# Patient Record
Sex: FEMALE | Race: WHITE | NOT HISPANIC OR LATINO | Employment: PART TIME | URBAN - METROPOLITAN AREA
[De-identification: names, ages, dates, MRNs, and addresses within clinical notes are randomized per-mention and may not be internally consistent; named-entity substitution may affect disease eponyms.]

---

## 2023-02-26 PROBLEM — H91.90 HEARING LOSS: Status: ACTIVE | Noted: 2023-02-26

## 2023-02-26 PROBLEM — G56.00 CARPAL TUNNEL SYNDROME: Status: ACTIVE | Noted: 2023-02-26

## 2023-02-26 PROBLEM — H90.3 BILATERAL HIGH FREQUENCY SENSORINEURAL HEARING LOSS: Status: ACTIVE | Noted: 2023-02-26

## 2023-02-26 PROBLEM — K21.9 GERD (GASTROESOPHAGEAL REFLUX DISEASE): Status: ACTIVE | Noted: 2023-02-26

## 2023-02-26 PROBLEM — G47.33 MODERATE OBSTRUCTIVE SLEEP APNEA: Status: ACTIVE | Noted: 2023-02-26

## 2023-02-26 PROBLEM — R53.83 FATIGUE: Status: ACTIVE | Noted: 2023-02-26

## 2023-02-26 PROBLEM — H93.13 TINNITUS OF BOTH EARS: Status: ACTIVE | Noted: 2023-02-26

## 2023-02-26 PROBLEM — E11.9 DM2 (DIABETES MELLITUS, TYPE 2) (MULTI): Status: ACTIVE | Noted: 2023-02-26

## 2023-02-26 PROBLEM — E66.01 CLASS 2 SEVERE OBESITY WITH SERIOUS COMORBIDITY AND BODY MASS INDEX (BMI) OF 38.0 TO 38.9 IN ADULT (MULTI): Status: ACTIVE | Noted: 2023-02-26

## 2023-02-26 PROBLEM — E55.9 VITAMIN D DEFICIENCY: Status: ACTIVE | Noted: 2023-02-26

## 2023-02-26 PROBLEM — R06.83 SNORING: Status: ACTIVE | Noted: 2023-02-26

## 2023-02-26 PROBLEM — H69.91 DYSFUNCTION OF RIGHT EUSTACHIAN TUBE: Status: ACTIVE | Noted: 2023-02-26

## 2023-02-26 PROBLEM — R29.898 BILATERAL ARM WEAKNESS: Status: ACTIVE | Noted: 2023-02-26

## 2023-02-26 PROBLEM — R25.1 SHAKINESS: Status: ACTIVE | Noted: 2023-02-26

## 2023-02-26 PROBLEM — R14.0 ABDOMINAL BLOATING: Status: ACTIVE | Noted: 2023-02-26

## 2023-02-26 PROBLEM — R63.5 WEIGHT GAIN: Status: ACTIVE | Noted: 2023-02-26

## 2023-02-26 PROBLEM — F41.9 ANXIETY DISORDER: Status: ACTIVE | Noted: 2023-02-26

## 2023-02-26 PROBLEM — R11.10 VOMITING: Status: ACTIVE | Noted: 2023-02-26

## 2023-02-26 PROBLEM — E66.812 CLASS 2 SEVERE OBESITY WITH SERIOUS COMORBIDITY AND BODY MASS INDEX (BMI) OF 38.0 TO 38.9 IN ADULT: Status: ACTIVE | Noted: 2023-02-26

## 2023-02-26 PROBLEM — R92.8 ABNORMAL MAMMOGRAM: Status: ACTIVE | Noted: 2023-02-26

## 2023-02-26 PROBLEM — K22.2 BENIGN ESOPHAGEAL STRICTURE: Status: ACTIVE | Noted: 2023-02-26

## 2023-02-26 PROBLEM — E28.319 EARLY MENOPAUSE OCCURRING IN PATIENT AGE YOUNGER THAN 45 YEARS: Status: ACTIVE | Noted: 2023-02-26

## 2023-02-26 RX ORDER — METFORMIN HYDROCHLORIDE 1000 MG/1
1 TABLET ORAL 2 TIMES DAILY
COMMUNITY
End: 2023-03-22 | Stop reason: SDUPTHER

## 2023-02-26 RX ORDER — CLONAZEPAM 0.25 MG/1
0.25 TABLET, ORALLY DISINTEGRATING ORAL AS NEEDED
COMMUNITY

## 2023-02-26 RX ORDER — LANCETS 33 GAUGE
EACH MISCELLANEOUS
COMMUNITY
End: 2023-10-16 | Stop reason: ALTCHOICE

## 2023-02-26 RX ORDER — PANTOPRAZOLE SODIUM 40 MG/1
1 TABLET, DELAYED RELEASE ORAL DAILY
COMMUNITY
End: 2023-04-17

## 2023-02-26 RX ORDER — BLOOD-GLUCOSE METER
1 EACH MISCELLANEOUS 2 TIMES DAILY
COMMUNITY
End: 2023-10-16 | Stop reason: ALTCHOICE

## 2023-02-26 RX ORDER — SERTRALINE HYDROCHLORIDE 100 MG/1
2 TABLET, FILM COATED ORAL DAILY
COMMUNITY
End: 2023-03-17

## 2023-03-17 DIAGNOSIS — F41.1 GENERALIZED ANXIETY DISORDER: Primary | ICD-10-CM

## 2023-03-17 RX ORDER — SERTRALINE HYDROCHLORIDE 100 MG/1
TABLET, FILM COATED ORAL
Qty: 180 TABLET | Refills: 0 | Status: SHIPPED | OUTPATIENT
Start: 2023-03-17 | End: 2023-06-15

## 2023-03-22 ENCOUNTER — OFFICE VISIT (OUTPATIENT)
Dept: PRIMARY CARE | Facility: CLINIC | Age: 48
End: 2023-03-22
Payer: COMMERCIAL

## 2023-03-22 ENCOUNTER — APPOINTMENT (OUTPATIENT)
Dept: PRIMARY CARE | Facility: CLINIC | Age: 48
End: 2023-03-22
Payer: COMMERCIAL

## 2023-03-22 VITALS
WEIGHT: 232 LBS | SYSTOLIC BLOOD PRESSURE: 128 MMHG | TEMPERATURE: 97.1 F | RESPIRATION RATE: 18 BRPM | BODY MASS INDEX: 36.41 KG/M2 | DIASTOLIC BLOOD PRESSURE: 78 MMHG | HEART RATE: 86 BPM | HEIGHT: 67 IN

## 2023-03-22 DIAGNOSIS — F41.1 GENERALIZED ANXIETY DISORDER: ICD-10-CM

## 2023-03-22 DIAGNOSIS — K21.9 GASTROESOPHAGEAL REFLUX DISEASE WITHOUT ESOPHAGITIS: Primary | ICD-10-CM

## 2023-03-22 DIAGNOSIS — E11.9 TYPE 2 DIABETES MELLITUS WITHOUT COMPLICATION, WITHOUT LONG-TERM CURRENT USE OF INSULIN (MULTI): ICD-10-CM

## 2023-03-22 LAB — POC HEMOGLOBIN A1C: 7.1 % (ref 4.2–6.5)

## 2023-03-22 PROCEDURE — 99214 OFFICE O/P EST MOD 30 MIN: CPT | Performed by: FAMILY MEDICINE

## 2023-03-22 PROCEDURE — 3074F SYST BP LT 130 MM HG: CPT | Performed by: FAMILY MEDICINE

## 2023-03-22 PROCEDURE — 1036F TOBACCO NON-USER: CPT | Performed by: FAMILY MEDICINE

## 2023-03-22 PROCEDURE — 3078F DIAST BP <80 MM HG: CPT | Performed by: FAMILY MEDICINE

## 2023-03-22 PROCEDURE — 83036 HEMOGLOBIN GLYCOSYLATED A1C: CPT | Performed by: FAMILY MEDICINE

## 2023-03-22 RX ORDER — ESTRADIOL 0.1 MG/G
CREAM VAGINAL
COMMUNITY
Start: 2023-01-30 | End: 2023-03-22 | Stop reason: ALTCHOICE

## 2023-03-22 RX ORDER — METFORMIN HYDROCHLORIDE 1000 MG/1
1000 TABLET ORAL 2 TIMES DAILY
Qty: 180 TABLET | Refills: 1 | Status: SHIPPED | OUTPATIENT
Start: 2023-03-22 | End: 2023-09-29

## 2023-03-22 RX ORDER — HYDROXYZINE HYDROCHLORIDE 25 MG/1
1 TABLET, FILM COATED ORAL 2 TIMES DAILY
COMMUNITY
Start: 2022-11-15 | End: 2023-10-16 | Stop reason: ALTCHOICE

## 2023-03-22 NOTE — PROGRESS NOTES
Molly Martins is a 48 y.o. female here today for 6 month follow up GERD and DM.        Diabetes recheck -- Patient feeling well.  No CP, numbness of feet, blurred vision, polyuria.  Trying to follow diet.  No side effects from medications.  No hypoglycemic symptoms.   We added Jardiance at her last visit because her A1c was not well controlled.  Has lost some weight since starting Jardiance.  A1C improved to 7.1 today.      GERD recheck -- Patient reports GI symptoms are well controlled with current treatment.  No heartburn, chest pain, vomiting, diarrhea.  No SE's from current treatment.  Patient wishes to continue the same treatment.      Mood disorder recheck -- Patient feels like condition is well controlled.  Has good control of mood and emotional reactions.  No SE's or problems with medications.  No homicidal or suicidal ideation.  Patient wishes to continue same medications.      Objective    Visit Vitals  /78   Pulse 86   Temp 36.2 °C (97.1 °F)   Resp 18       Physical Exam   General - Not in acute distress and cooperative.  Build & Nutrition - Well developed  Posture - Normal  Gait - Normal  Mental Status - alert and oriented x 3    Head - Normocephalic    Neck - Thyroid normal size    Eyes - Bilateral - Sclera clear and lids pink without edema or mass.      Skin - Warm and dry with no rashes on visible skin    Lungs - Clear to auscultation and normal breathing effort    Cardiovascular - RRR and no murmurs, rubs or thrill.    Peripheral Vascular - Bilateral - no edema present    Neuropsychiatric - normal mood and affect        Assessment      Assess/Plan SmartLinks: Diagnoses and all orders for this visit:  Gastroesophageal reflux disease without esophagitis   Condition well controlled.  No change in current treatment regimen.  Refill given of current medication.  Make a follow up appointment with me for recheck in 6 months.    Type 2 diabetes mellitus without complication, without long-term  current use of insulin (CMS/[facility])  -     metFORMIN (Glucophage) 1,000 mg tablet; Take 1 tablet (1,000 mg) by mouth in the morning and 1 tablet (1,000 mg) before bedtime.  -     POCT glycosylated hemoglobin (Hb A1C) manually resulted  Her A1c has improved to 7.1 since starting Jardiance 3 months ago.  No change in current treatment regimen.  Refill given of current medication.  Make a follow up appointment with me for recheck in 4 months.  Generalized anxiety disorder   Condition well controlled.  No change in current treatment regimen.  Refill given of current medication.  Make a follow up appointment with me for recheck in 6 months.

## 2023-04-16 DIAGNOSIS — K21.9 GASTRO-ESOPHAGEAL REFLUX DISEASE WITHOUT ESOPHAGITIS: ICD-10-CM

## 2023-04-17 RX ORDER — PANTOPRAZOLE SODIUM 40 MG/1
TABLET, DELAYED RELEASE ORAL
Qty: 90 TABLET | Refills: 1 | Status: SHIPPED | OUTPATIENT
Start: 2023-04-17 | End: 2023-10-17 | Stop reason: SDUPTHER

## 2023-05-26 DIAGNOSIS — E11.9 TYPE 2 DIABETES MELLITUS WITHOUT COMPLICATION, WITHOUT LONG-TERM CURRENT USE OF INSULIN (MULTI): Primary | ICD-10-CM

## 2023-05-26 RX ORDER — EMPAGLIFLOZIN 10 MG/1
TABLET, FILM COATED ORAL
Qty: 90 TABLET | Refills: 1 | Status: SHIPPED | OUTPATIENT
Start: 2023-05-26 | End: 2023-07-17 | Stop reason: SDUPTHER

## 2023-06-15 DIAGNOSIS — F41.1 GENERALIZED ANXIETY DISORDER: ICD-10-CM

## 2023-06-15 RX ORDER — SERTRALINE HYDROCHLORIDE 100 MG/1
TABLET, FILM COATED ORAL
Qty: 180 TABLET | Refills: 0 | Status: SHIPPED | OUTPATIENT
Start: 2023-06-15

## 2023-07-17 ENCOUNTER — OFFICE VISIT (OUTPATIENT)
Dept: PRIMARY CARE | Facility: CLINIC | Age: 48
End: 2023-07-17
Payer: COMMERCIAL

## 2023-07-17 VITALS
HEART RATE: 91 BPM | TEMPERATURE: 97.2 F | SYSTOLIC BLOOD PRESSURE: 120 MMHG | BODY MASS INDEX: 35.16 KG/M2 | HEIGHT: 67 IN | DIASTOLIC BLOOD PRESSURE: 78 MMHG | RESPIRATION RATE: 16 BRPM | WEIGHT: 224 LBS

## 2023-07-17 DIAGNOSIS — Z12.31 ENCOUNTER FOR SCREENING MAMMOGRAM FOR MALIGNANT NEOPLASM OF BREAST: ICD-10-CM

## 2023-07-17 DIAGNOSIS — E11.9 TYPE 2 DIABETES MELLITUS WITHOUT COMPLICATION, WITHOUT LONG-TERM CURRENT USE OF INSULIN (MULTI): ICD-10-CM

## 2023-07-17 PROBLEM — M54.2 CERVICALGIA: Status: ACTIVE | Noted: 2023-07-17

## 2023-07-17 PROBLEM — K80.20 SYMPTOMATIC CHOLELITHIASIS: Status: ACTIVE | Noted: 2020-05-07

## 2023-07-17 PROBLEM — M25.512 LEFT SHOULDER PAIN: Status: ACTIVE | Noted: 2023-07-17

## 2023-07-17 LAB — POC HEMOGLOBIN A1C: 7.2 % (ref 4.2–6.5)

## 2023-07-17 PROCEDURE — 1036F TOBACCO NON-USER: CPT | Performed by: FAMILY MEDICINE

## 2023-07-17 PROCEDURE — 99213 OFFICE O/P EST LOW 20 MIN: CPT | Performed by: FAMILY MEDICINE

## 2023-07-17 PROCEDURE — 3074F SYST BP LT 130 MM HG: CPT | Performed by: FAMILY MEDICINE

## 2023-07-17 PROCEDURE — 83036 HEMOGLOBIN GLYCOSYLATED A1C: CPT | Performed by: FAMILY MEDICINE

## 2023-07-17 PROCEDURE — 3078F DIAST BP <80 MM HG: CPT | Performed by: FAMILY MEDICINE

## 2023-07-17 NOTE — PROGRESS NOTES
7.2Kirsten SHAVONNE Martins is a 48 y.o. female here today for 4 month follow up DM.   Chief Complaint   Patient presents with    Diabetes        HPI   Left ear pain off and on for 2 months.      Diabetes recheck -- Patient feeling well.  No CP, numbness of feet, blurred vision, polyuria.  Trying to follow diet.  No side effects from medications.  No hypoglycemic symptoms.   Not monitoring.  A1C was 7.2 today and was 7.1 at last visit.      Trouble sleeping again - using melatonin OTC prn but got used to it and not using now.  Psychiatrist has Rxed some meds for this.  Trazodone made her groggy.  Doxepin did not help - started recently.            Current Outpatient Medications:     blood sugar diagnostic (OneTouch Verio test strips) strip, 1 each  in the morning and 1 each in the evening., Disp: , Rfl:     clonazePAM (KlonoPIN) 0.25 mg disintegrating tablet, Take 1 tablet (0.25 mg) by mouth if needed., Disp: , Rfl:     hydrOXYzine HCL (Atarax) 25 mg tablet, Take 1 tablet (25 mg) by mouth 2 times a day., Disp: , Rfl:     lancets 33 gauge misc, Use as directed., Disp: , Rfl:     metFORMIN (Glucophage) 1,000 mg tablet, Take 1 tablet (1,000 mg) by mouth in the morning and 1 tablet (1,000 mg) before bedtime., Disp: 180 tablet, Rfl: 1    pantoprazole (ProtoNix) 40 mg EC tablet, TAKE 1 TABLET BY MOUTH EVERY DAY, Disp: 90 tablet, Rfl: 1    sertraline (Zoloft) 100 mg tablet, TAKE 2 TABLETS DAILY, Disp: 180 tablet, Rfl: 0    empagliflozin (Jardiance) 25 mg, Take 1 tablet (25 mg) by mouth once daily., Disp: 90 tablet, Rfl: 1    Patient Active Problem List   Diagnosis    Abdominal bloating    Abnormal mammogram    Benign esophageal stricture    Anxiety disorder    Bilateral arm weakness    Bilateral high frequency sensorineural hearing loss    Carpal tunnel syndrome    Type 2 diabetes mellitus without complication, without long-term current use of insulin (CMS/HCC)    Dysfunction of right eustachian tube    Early menopause occurring  "in patient age younger than 45 years    Fatigue    GERD (gastroesophageal reflux disease)    Hearing loss    Moderate obstructive sleep apnea    Shakiness    Snoring    Tinnitus of both ears    Vitamin D deficiency    Vomiting    Weight gain    Class 2 severe obesity with serious comorbidity and body mass index (BMI) of 38.0 to 38.9 in adult (CMS/[facility])    Cervicalgia    Group B streptococcal infection in pregnancy    History of anxiety disorder    Left shoulder pain    Polyhydramnios    Symptomatic cholelithiasis         Recent Results (from the past 2016 hour(s))   POCT glycosylated hemoglobin (Hb A1C) manually resulted    Collection Time: 07/17/23 10:18 AM   Result Value Ref Range    POC HEMOGLOBIN A1c 7.2 (A) 4.2 - 6.5 %        Objective    Visit Vitals  /78   Pulse 91   Temp 36.2 °C (97.2 °F)   Resp 16   Ht 1.702 m (5' 7\")   Wt 102 kg (224 lb)   BMI 35.08 kg/m²     Body mass index is 35.08 kg/m².     Physical Exam   General - Not in acute distress and cooperative.  Build & Nutrition - Well developed  Posture - Normal  Gait - Normal  Mental Status - alert and oriented x 3    Head - Normocephalic    Neck - Thyroid normal size    Eyes - Bilateral - Sclera clear and lids pink without edema or mass.      Skin - Warm and dry with no rashes on visible skin    Lungs - Clear to auscultation and normal breathing effort    Cardiovascular - RRR and no murmurs, rubs or thrill.    Peripheral Vascular - Bilateral - no edema present    Neuropsychiatric - normal mood and affect    Ears-bilateral tympanic membranes and canals are completely normal on exam.    Assessment    1. Encounter for screening mammogram for malignant neoplasm of breast  BI mammo bilateral screening tomosynthesis   The patient is due for mammogram so this was ordered.     2. Type 2 diabetes mellitus without complication, without long-term current use of insulin (CMS/[facility])  POCT glycosylated hemoglobin (Hb A1C) manually resulted, CBC, Comprehensive " Metabolic Panel, Lipid Panel, Albumin , Urine Random, empagliflozin (Jardiance) 25 mg   Her A1c is suboptimally controlled and has increased slightly since her last visit.  We discussed diet changes.  We will increase Jardiance to 25 mg daily and continue the same dose of metformin.  We will recheck in 6 months.  Appropriate labs ordered or reviewed.     I recommend that she discuss the problems sleeping with her psychiatrist since he has been managing this.

## 2023-09-29 DIAGNOSIS — E11.9 TYPE 2 DIABETES MELLITUS WITHOUT COMPLICATION, WITHOUT LONG-TERM CURRENT USE OF INSULIN (MULTI): ICD-10-CM

## 2023-09-29 RX ORDER — METFORMIN HYDROCHLORIDE 1000 MG/1
1000 TABLET ORAL 2 TIMES DAILY
Qty: 180 TABLET | Refills: 1 | Status: SHIPPED | OUTPATIENT
Start: 2023-09-29 | End: 2023-10-17 | Stop reason: SDUPTHER

## 2023-10-13 ASSESSMENT — ENCOUNTER SYMPTOMS
CONFUSION: 0
POLYPHAGIA: 0
NERVOUS/ANXIOUS: 0
SWEATS: 0
FATIGUE: 0
SPEECH DIFFICULTY: 0
BLURRED VISION: 0
HUNGER: 0
VISUAL CHANGE: 0
WEAKNESS: 0
HEADACHES: 0
BLACKOUTS: 0
DIZZINESS: 0
WEIGHT LOSS: 0
POLYDIPSIA: 0
SEIZURES: 0
TREMORS: 0

## 2023-10-16 RX ORDER — DOXEPIN HYDROCHLORIDE 10 MG/1
CAPSULE ORAL
COMMUNITY
Start: 2023-09-08 | End: 2024-04-17 | Stop reason: WASHOUT

## 2023-10-17 ENCOUNTER — OFFICE VISIT (OUTPATIENT)
Dept: PRIMARY CARE | Facility: CLINIC | Age: 48
End: 2023-10-17
Payer: COMMERCIAL

## 2023-10-17 VITALS
SYSTOLIC BLOOD PRESSURE: 126 MMHG | BODY MASS INDEX: 36.1 KG/M2 | DIASTOLIC BLOOD PRESSURE: 74 MMHG | HEIGHT: 67 IN | HEART RATE: 102 BPM | WEIGHT: 230 LBS | RESPIRATION RATE: 18 BRPM | TEMPERATURE: 97 F

## 2023-10-17 DIAGNOSIS — E11.9 TYPE 2 DIABETES MELLITUS WITHOUT COMPLICATION, WITHOUT LONG-TERM CURRENT USE OF INSULIN (MULTI): ICD-10-CM

## 2023-10-17 DIAGNOSIS — E55.9 VITAMIN D DEFICIENCY: Primary | ICD-10-CM

## 2023-10-17 DIAGNOSIS — K21.9 GASTRO-ESOPHAGEAL REFLUX DISEASE WITHOUT ESOPHAGITIS: ICD-10-CM

## 2023-10-17 PROBLEM — F41.1 GENERALIZED ANXIETY DISORDER: Status: ACTIVE | Noted: 2023-02-26

## 2023-10-17 PROBLEM — R11.10 VOMITING: Status: RESOLVED | Noted: 2023-02-26 | Resolved: 2023-10-17

## 2023-10-17 PROBLEM — R06.83 SNORING: Status: RESOLVED | Noted: 2023-02-26 | Resolved: 2023-10-17

## 2023-10-17 LAB — POC HEMOGLOBIN A1C: 7.1 % (ref 4.2–6.5)

## 2023-10-17 PROCEDURE — 99214 OFFICE O/P EST MOD 30 MIN: CPT | Performed by: FAMILY MEDICINE

## 2023-10-17 PROCEDURE — 83036 HEMOGLOBIN GLYCOSYLATED A1C: CPT | Performed by: FAMILY MEDICINE

## 2023-10-17 PROCEDURE — 3074F SYST BP LT 130 MM HG: CPT | Performed by: FAMILY MEDICINE

## 2023-10-17 PROCEDURE — 1036F TOBACCO NON-USER: CPT | Performed by: FAMILY MEDICINE

## 2023-10-17 PROCEDURE — 3078F DIAST BP <80 MM HG: CPT | Performed by: FAMILY MEDICINE

## 2023-10-17 RX ORDER — PANTOPRAZOLE SODIUM 40 MG/1
40 TABLET, DELAYED RELEASE ORAL DAILY
Qty: 90 TABLET | Refills: 1 | Status: SHIPPED | OUTPATIENT
Start: 2023-10-17 | End: 2024-04-18 | Stop reason: SDUPTHER

## 2023-10-17 RX ORDER — METFORMIN HYDROCHLORIDE 1000 MG/1
1000 TABLET ORAL 2 TIMES DAILY
Qty: 180 TABLET | Refills: 1 | Status: SHIPPED | OUTPATIENT
Start: 2023-10-17 | End: 2024-04-18 | Stop reason: ALTCHOICE

## 2023-10-17 ASSESSMENT — ENCOUNTER SYMPTOMS
BLACKOUTS: 0
SPEECH DIFFICULTY: 0
WEIGHT LOSS: 0
SEIZURES: 0
HUNGER: 0
TREMORS: 0
CONFUSION: 0
POLYPHAGIA: 0
VISUAL CHANGE: 0
WEAKNESS: 0
BLURRED VISION: 0
HEADACHES: 0
SWEATS: 0
DIZZINESS: 0
NERVOUS/ANXIOUS: 0
FATIGUE: 0
POLYDIPSIA: 0

## 2023-10-17 NOTE — PROGRESS NOTES
Answers submitted by the patient for this visit:  Diabetes Questionnaire (Submitted on 10/13/2023)  Chief Complaint: Diabetes problem  Diabetes type: type 2  MedicAlert ID: No  Disease duration: 1 Years  blurred vision: No  chest pain: No  fatigue: No  foot paresthesias: No  foot ulcerations: No  polydipsia: No  polyphagia: No  polyuria: No  visual change: No  weakness: No  weight loss: No  Symptom course: stable  confusion: No  dizziness: No  headaches: No  hunger: No  mood changes: No  nervous/anxious: No  pallor: No  seizures: No  sleepiness: No  speech difficulty: No  sweats: No  tremors: No  blackouts: No  hospitalization: No  nocturnal hypoglycemia: No  required assistance: No  required glucagon: No  CVA: No  heart disease: No  impotence: No  nephropathy: No  peripheral neuropathy: No  PVD: No  retinopathy: No  CAD risks: no known risk factors  Current treatments: oral agent (monotherapy)  Treatment compliance: all of the time  Blood glucose trend: no change  Molly Martins is a 48 y.o. female here today for   Chief Complaint   Patient presents with    Diabetes    GERD    Anxiety        Diabetes  She has type 2 diabetes mellitus. No MedicAlert identification noted. The initial diagnosis of diabetes was made 1 years ago. Pertinent negatives for hypoglycemia include no confusion, dizziness, headaches, hunger, mood changes, nervousness/anxiousness, pallor, seizures, sleepiness, speech difficulty, sweats or tremors. Pertinent negatives for diabetes include no blurred vision, no chest pain, no fatigue, no foot paresthesias, no foot ulcerations, no polydipsia, no polyphagia, no polyuria, no visual change, no weakness and no weight loss. Pertinent negatives for hypoglycemia complications include no blackouts, no hospitalization, no nocturnal hypoglycemia, no required assistance and no required glucagon injection. Symptoms are stable. Pertinent negatives for diabetic complications include no CVA, heart disease,  impotence, nephropathy, peripheral neuropathy, PVD or retinopathy. There are no known risk factors for coronary artery disease. Current diabetic treatment includes oral agent (monotherapy). She is compliant with treatment all of the time. There is no change in her home blood glucose trend.      Diabetes recheck -- Patient feeling well.  No CP, numbness of feet, blurred vision, polyuria.  Trying to follow diet.  No side effects from medications.  No hypoglycemic symptoms.  A1C is 7.1 today.  Eats 3 meals per day and trying to watch carb intake.  Wt is up slightly.      Mood disorder recheck --  managed by psychiatrist.  Seems pretty well controlled.      GERD recheck -- Patient reports GI symptoms are well controlled with current treatment.  No heartburn, chest pain, vomiting, diarrhea.  No SE's from current treatment.  Patient wishes to continue the same treatment.  Takes pantoprazole daily or gets recurrence of sxs.        Current Outpatient Medications:     clonazePAM (KlonoPIN) 0.25 mg disintegrating tablet, Take 1 tablet (0.25 mg) by mouth if needed., Disp: , Rfl:     doxepin (SINEquan) 10 mg capsule, TAKE 4 CAPSULES BY MOUTH ONCE DAILY AT BEDTIME, Disp: , Rfl:     sertraline (Zoloft) 100 mg tablet, TAKE 2 TABLETS DAILY, Disp: 180 tablet, Rfl: 0    empagliflozin (Jardiance) 25 mg, Take 1 tablet (25 mg) by mouth once daily., Disp: 90 tablet, Rfl: 1    metFORMIN (Glucophage) 1,000 mg tablet, Take 1 tablet (1,000 mg) by mouth 2 times a day., Disp: 180 tablet, Rfl: 1    pantoprazole (ProtoNix) 40 mg EC tablet, Take 1 tablet (40 mg) by mouth once daily. Do not crush, chew, or split., Disp: 90 tablet, Rfl: 1    Patient Active Problem List   Diagnosis    Abdominal bloating    Abnormal mammogram    Benign esophageal stricture    Generalized anxiety disorder    Bilateral arm weakness    Bilateral high frequency sensorineural hearing loss    Carpal tunnel syndrome    Type 2 diabetes mellitus without complication, without  "long-term current use of insulin (CMS/[facility])    Dysfunction of right eustachian tube    Early menopause occurring in patient age younger than 45 years    Fatigue    GERD without esophagitis    Hearing loss    Moderate obstructive sleep apnea    Shakiness    Tinnitus of both ears    Vitamin D deficiency    Weight gain    Class 2 severe obesity with serious comorbidity and body mass index (BMI) of 38.0 to 38.9 in adult (CMS/HCC)    Cervicalgia    Left shoulder pain    Polyhydramnios    Symptomatic cholelithiasis         Recent Results (from the past 672 hour(s))   POCT glycosylated hemoglobin (Hb A1C) manually resulted    Collection Time: 10/17/23 10:05 AM   Result Value Ref Range    POC HEMOGLOBIN A1c 7.1 (A) 4.2 - 6.5 %        Objective    Visit Vitals  /74   Pulse 102   Temp 36.1 °C (97 °F)   Resp 18   Ht 1.702 m (5' 7\")   Wt 104 kg (230 lb)   BMI 36.02 kg/m²     Body mass index is 36.02 kg/m².     Physical Exam     General - Not in acute distress and cooperative.  Build & Nutrition - Well developed  Posture - Normal  Gait - Normal  Mental Status - alert and oriented x 3    Head - Normocephalic    Neck - Thyroid normal size    Eyes - Bilateral - Sclera clear and lids pink without edema or mass.      Skin - Warm and dry with no rashes on visible skin    Lungs - Clear to auscultation and normal breathing effort    Cardiovascular - RRR and no murmurs, rubs or thrill.    Peripheral Vascular - Bilateral - no edema present    Neuropsychiatric - normal mood and affect        Assessment    1. Vitamin D deficiency  Vitamin D 25-Hydroxy,Total (for eval of Vitamin D levels)   Appropriate labs ordered or reviewed.  No change in current treatment regimen.     2. Type 2 diabetes mellitus without complication, without long-term current use of insulin (CMS/[facility])  POCT glycosylated hemoglobin (Hb A1C) manually resulted, Comprehensive Metabolic Panel, CBC, Lipid Panel, metFORMIN (Glucophage) 1,000 mg tablet, empagliflozin " (Jardiance) 25 mg   Condition well controlled.  No change in current treatment regimen.  Refill given of current medication.  Appropriate labs ordered or reviewed.  Make a follow up appointment with me for recheck in 6 months.     3. Gastro-esophageal reflux disease without esophagitis  Comprehensive Metabolic Panel, pantoprazole (ProtoNix) 40 mg EC tablet   Condition well controlled.  No change in current treatment regimen.  Refill given of current medication.  Make a follow up appointment with me for recheck in 6 months.  Reviewed long term risk vs benefit of chronic PPI use.  Reviewed cardiac, skeletal and kidney possible SE's in detail.  Recommend to take as little as possible for symptom control to minimize long term risks.  Pt understands fully.

## 2024-04-05 ENCOUNTER — LAB (OUTPATIENT)
Dept: LAB | Facility: LAB | Age: 49
End: 2024-04-05
Payer: COMMERCIAL

## 2024-04-05 DIAGNOSIS — E55.9 VITAMIN D DEFICIENCY: ICD-10-CM

## 2024-04-05 DIAGNOSIS — K21.9 GASTRO-ESOPHAGEAL REFLUX DISEASE WITHOUT ESOPHAGITIS: ICD-10-CM

## 2024-04-05 DIAGNOSIS — E11.9 TYPE 2 DIABETES MELLITUS WITHOUT COMPLICATION, WITHOUT LONG-TERM CURRENT USE OF INSULIN (MULTI): ICD-10-CM

## 2024-04-05 LAB
25(OH)D3 SERPL-MCNC: 21 NG/ML (ref 30–100)
ALBUMIN SERPL BCP-MCNC: 4.5 G/DL (ref 3.4–5)
ALP SERPL-CCNC: 67 U/L (ref 33–110)
ALT SERPL W P-5'-P-CCNC: 49 U/L (ref 7–45)
ANION GAP SERPL CALC-SCNC: 12 MMOL/L (ref 10–20)
AST SERPL W P-5'-P-CCNC: 27 U/L (ref 9–39)
BILIRUB SERPL-MCNC: 0.5 MG/DL (ref 0–1.2)
BUN SERPL-MCNC: 16 MG/DL (ref 6–23)
CALCIUM SERPL-MCNC: 10.5 MG/DL (ref 8.6–10.3)
CHLORIDE SERPL-SCNC: 104 MMOL/L (ref 98–107)
CHOLEST SERPL-MCNC: 209 MG/DL (ref 0–199)
CHOLESTEROL/HDL RATIO: 4
CO2 SERPL-SCNC: 27 MMOL/L (ref 21–32)
CREAT SERPL-MCNC: 0.84 MG/DL (ref 0.5–1.05)
CREAT UR-MCNC: 96.5 MG/DL (ref 20–320)
EGFRCR SERPLBLD CKD-EPI 2021: 86 ML/MIN/1.73M*2
ERYTHROCYTE [DISTWIDTH] IN BLOOD BY AUTOMATED COUNT: 13.6 % (ref 11.5–14.5)
GLUCOSE SERPL-MCNC: 159 MG/DL (ref 74–99)
HCT VFR BLD AUTO: 44 % (ref 36–46)
HDLC SERPL-MCNC: 52.8 MG/DL
HGB BLD-MCNC: 14.3 G/DL (ref 12–16)
LDLC SERPL CALC-MCNC: 105 MG/DL
MCH RBC QN AUTO: 27.8 PG (ref 26–34)
MCHC RBC AUTO-ENTMCNC: 32.5 G/DL (ref 32–36)
MCV RBC AUTO: 86 FL (ref 80–100)
MICROALBUMIN UR-MCNC: 18.2 MG/L
MICROALBUMIN/CREAT UR: 18.9 UG/MG CREAT
NON HDL CHOLESTEROL: 156 MG/DL (ref 0–149)
NRBC BLD-RTO: 0 /100 WBCS (ref 0–0)
PLATELET # BLD AUTO: 340 X10*3/UL (ref 150–450)
POTASSIUM SERPL-SCNC: 4.2 MMOL/L (ref 3.5–5.3)
PROT SERPL-MCNC: 7.2 G/DL (ref 6.4–8.2)
RBC # BLD AUTO: 5.14 X10*6/UL (ref 4–5.2)
SODIUM SERPL-SCNC: 139 MMOL/L (ref 136–145)
TRIGL SERPL-MCNC: 255 MG/DL (ref 0–149)
VLDL: 51 MG/DL (ref 0–40)
WBC # BLD AUTO: 9.7 X10*3/UL (ref 4.4–11.3)

## 2024-04-05 PROCEDURE — 82043 UR ALBUMIN QUANTITATIVE: CPT

## 2024-04-05 PROCEDURE — 82570 ASSAY OF URINE CREATININE: CPT

## 2024-04-05 PROCEDURE — 80061 LIPID PANEL: CPT

## 2024-04-05 PROCEDURE — 80053 COMPREHEN METABOLIC PANEL: CPT

## 2024-04-05 PROCEDURE — 36415 COLL VENOUS BLD VENIPUNCTURE: CPT

## 2024-04-05 PROCEDURE — 82306 VITAMIN D 25 HYDROXY: CPT

## 2024-04-05 PROCEDURE — 85027 COMPLETE CBC AUTOMATED: CPT

## 2024-04-10 ENCOUNTER — TELEPHONE (OUTPATIENT)
Dept: PRIMARY CARE | Facility: CLINIC | Age: 49
End: 2024-04-10
Payer: COMMERCIAL

## 2024-04-10 DIAGNOSIS — E55.9 VITAMIN D DEFICIENCY: ICD-10-CM

## 2024-04-10 NOTE — TELEPHONE ENCOUNTER
"----- Message from Maykel Worley MD sent at 4/10/2024  7:46 AM EDT -----  Inform of low Vitamin D.  Take 5000 Units daily OTC.  Order Vitamin D 25 OH in 2 months and send requisition to patient.  Add Dx of \"Vitamin D deficiency\" to dx list and add medications in chart.  Her cholesterol was also elevated up to 209.  It is recommended that all diabetic patients regardless of their cholesterol reading be on a cholesterol-lowering medicine.  This is because of the high risk of atherosclerosis in diabetics.  I recommend that we start atorvastatin 20 mg once daily.  Please add mixed hyperlipidemia to her diagnosis list.  If the patient agrees please send in a 6-month prescription for this medication.  We must repeat her lipid panel, CMP and vitamin D level in 2 months.  Please order these labs.  The rest of her labs were essentially normal.  "

## 2024-04-10 NOTE — RESULT ENCOUNTER NOTE
"Inform of low Vitamin D.  Take 5000 Units daily OTC.  Order Vitamin D 25 OH in 2 months and send requisition to patient.  Add Dx of \"Vitamin D deficiency\" to dx list and add medications in chart.  Her cholesterol was also elevated up to 209.  It is recommended that all diabetic patients regardless of their cholesterol reading be on a cholesterol-lowering medicine.  This is because of the high risk of atherosclerosis in diabetics.  I recommend that we start atorvastatin 20 mg once daily.  Please add mixed hyperlipidemia to her diagnosis list.  If the patient agrees please send in a 6-month prescription for this medication.  We must repeat her lipid panel, CMP and vitamin D level in 2 months.  Please order these labs.  The rest of her labs were essentially normal."

## 2024-04-10 NOTE — TELEPHONE ENCOUNTER
Patient informed and verbalized understanding, she declines starting a cholesterol medication at this time.

## 2024-04-12 ASSESSMENT — ENCOUNTER SYMPTOMS
BACK PAIN: 1
CONSTIPATION: 0
HEMATURIA: 0
FEVER: 0
NAUSEA: 0
SORE THROAT: 0
ABDOMINAL PAIN: 0
HEADACHES: 0
ANOREXIA: 0
FREQUENCY: 0
FLANK PAIN: 0
DYSURIA: 0
VOMITING: 0
CHILLS: 0
DIARRHEA: 0

## 2024-04-17 DIAGNOSIS — E11.9 TYPE 2 DIABETES MELLITUS WITHOUT COMPLICATION, WITHOUT LONG-TERM CURRENT USE OF INSULIN (MULTI): ICD-10-CM

## 2024-04-17 PROCEDURE — 83036 HEMOGLOBIN GLYCOSYLATED A1C: CPT | Performed by: FAMILY MEDICINE

## 2024-04-18 ENCOUNTER — OFFICE VISIT (OUTPATIENT)
Dept: PRIMARY CARE | Facility: CLINIC | Age: 49
End: 2024-04-18
Payer: COMMERCIAL

## 2024-04-18 VITALS
BODY MASS INDEX: 36.88 KG/M2 | HEIGHT: 67 IN | TEMPERATURE: 98 F | SYSTOLIC BLOOD PRESSURE: 126 MMHG | HEART RATE: 86 BPM | WEIGHT: 235 LBS | RESPIRATION RATE: 18 BRPM | DIASTOLIC BLOOD PRESSURE: 82 MMHG

## 2024-04-18 DIAGNOSIS — E11.9 TYPE 2 DIABETES MELLITUS WITHOUT COMPLICATION, WITHOUT LONG-TERM CURRENT USE OF INSULIN (MULTI): ICD-10-CM

## 2024-04-18 DIAGNOSIS — K21.9 GASTRO-ESOPHAGEAL REFLUX DISEASE WITHOUT ESOPHAGITIS: ICD-10-CM

## 2024-04-18 DIAGNOSIS — Z12.31 BREAST CANCER SCREENING BY MAMMOGRAM: Primary | ICD-10-CM

## 2024-04-18 DIAGNOSIS — M77.12 LATERAL EPICONDYLITIS OF LEFT ELBOW: ICD-10-CM

## 2024-04-18 LAB — POC HEMOGLOBIN A1C: 7.7 % (ref 4.2–6.5)

## 2024-04-18 PROCEDURE — 3074F SYST BP LT 130 MM HG: CPT | Performed by: FAMILY MEDICINE

## 2024-04-18 PROCEDURE — 99214 OFFICE O/P EST MOD 30 MIN: CPT | Performed by: FAMILY MEDICINE

## 2024-04-18 PROCEDURE — 3079F DIAST BP 80-89 MM HG: CPT | Performed by: FAMILY MEDICINE

## 2024-04-18 PROCEDURE — 3049F LDL-C 100-129 MG/DL: CPT | Performed by: FAMILY MEDICINE

## 2024-04-18 PROCEDURE — 1036F TOBACCO NON-USER: CPT | Performed by: FAMILY MEDICINE

## 2024-04-18 PROCEDURE — 3061F NEG MICROALBUMINURIA REV: CPT | Performed by: FAMILY MEDICINE

## 2024-04-18 RX ORDER — PANTOPRAZOLE SODIUM 40 MG/1
40 TABLET, DELAYED RELEASE ORAL DAILY
Qty: 90 TABLET | Refills: 1 | Status: SHIPPED | OUTPATIENT
Start: 2024-04-18

## 2024-04-18 NOTE — PROGRESS NOTES
Molly Martins is a 49 y.o. female here today for   Chief Complaint   Patient presents with    Diabetes    GERD    Anxiety        Diabetes  She presents for her follow-up diabetic visit. She has type 2 diabetes mellitus.   GERD  This is a chronic problem. The current episode started more than 1 year ago.   Anxiety  Presents for follow-up visit.       Diabetes recheck -- Patient feeling well.  No CP, numbness of feet, blurred vision, polyuria.  Trying to follow diet.  No side effects from medications.  No hypoglycemic symptoms.  Her A1c is higher today at 7.7.  It was 7.1 six months ago.  Working on diet and walking 30 mins per day.      Mood disorder recheck --this is managed by her psychiatrist.    GERD recheck -- Patient reports GI symptoms are well controlled with current treatment.  No heartburn, chest pain, vomiting, diarrhea.  No SE's from current treatment.  Patient wishes to continue the same treatment.     Left elbow pain.  Present off and on since years ago.  She says it has gotten worse lately.  The pain is over the lateral epicondyles area and down into the proximal forearm.  She says it is worse with increased activity and lifting.  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  She reports no weakness or numbness or tingling.        Current Outpatient Medications:     clonazePAM (KlonoPIN) 0.25 mg disintegrating tablet, Take 1 tablet (0.25 mg) by mouth if needed., Disp: , Rfl:     sertraline (Zoloft) 100 mg tablet, TAKE 2 TABLETS DAILY, Disp: 180 tablet, Rfl: 0    empagliflozin (Jardiance) 25 mg, Take 1 tablet (25 mg) by mouth once daily., Disp: 90 tablet, Rfl: 1    pantoprazole (ProtoNix) 40 mg EC tablet, Take 1 tablet (40 mg) by mouth once daily. Do not crush, chew, or split., Disp: 90 tablet, Rfl: 1    [START ON 4/21/2024] semaglutide 0.25 mg or 0.5 mg (2 mg/3 mL) pen injector, Inject 0.25 mg under the skin 1  (one) time per week., Disp: 3 mL, Rfl: 0    Patient Active Problem List   Diagnosis    Abdominal bloating    Abnormal mammogram    Benign esophageal stricture    Generalized anxiety disorder    Bilateral arm weakness    Bilateral high frequency sensorineural hearing loss    Carpal tunnel syndrome    Type 2 diabetes mellitus without complication, without long-term current use of insulin (Multi)    Dysfunction of right eustachian tube    Early menopause occurring in patient age younger than 45 years    Fatigue    GERD without esophagitis    Hearing loss    Moderate obstructive sleep apnea    Shakiness    Tinnitus of both ears    Vitamin D deficiency    Weight gain    Class 2 severe obesity with serious comorbidity and body mass index (BMI) of 38.0 to 38.9 in adult (Multi)    Cervicalgia    Left shoulder pain    Polyhydramnios (HHS-HCC)    Symptomatic cholelithiasis         Recent Results (from the past 672 hour(s))   CBC    Collection Time: 04/05/24  8:36 AM   Result Value Ref Range    WBC 9.7 4.4 - 11.3 x10*3/uL    nRBC 0.0 0.0 - 0.0 /100 WBCs    RBC 5.14 4.00 - 5.20 x10*6/uL    Hemoglobin 14.3 12.0 - 16.0 g/dL    Hematocrit 44.0 36.0 - 46.0 %    MCV 86 80 - 100 fL    MCH 27.8 26.0 - 34.0 pg    MCHC 32.5 32.0 - 36.0 g/dL    RDW 13.6 11.5 - 14.5 %    Platelets 340 150 - 450 x10*3/uL   Comprehensive Metabolic Panel    Collection Time: 04/05/24  8:36 AM   Result Value Ref Range    Glucose 159 (H) 74 - 99 mg/dL    Sodium 139 136 - 145 mmol/L    Potassium 4.2 3.5 - 5.3 mmol/L    Chloride 104 98 - 107 mmol/L    Bicarbonate 27 21 - 32 mmol/L    Anion Gap 12 10 - 20 mmol/L    Urea Nitrogen 16 6 - 23 mg/dL    Creatinine 0.84 0.50 - 1.05 mg/dL    eGFR 86 >60 mL/min/1.73m*2    Calcium 10.5 (H) 8.6 - 10.3 mg/dL    Albumin 4.5 3.4 - 5.0 g/dL    Alkaline Phosphatase 67 33 - 110 U/L    Total Protein 7.2 6.4 - 8.2 g/dL    AST 27 9 - 39 U/L    Bilirubin, Total 0.5 0.0 - 1.2 mg/dL    ALT 49 (H) 7 - 45 U/L   Lipid Panel    Collection  "Time: 04/05/24  8:36 AM   Result Value Ref Range    Cholesterol 209 (H) 0 - 199 mg/dL    HDL-Cholesterol 52.8 mg/dL    Cholesterol/HDL Ratio 4.0     LDL Calculated 105 (H) <=99 mg/dL    VLDL 51 (H) 0 - 40 mg/dL    Triglycerides 255 (H) 0 - 149 mg/dL    Non HDL Cholesterol 156 (H) 0 - 149 mg/dL   Albumin , Urine Random    Collection Time: 04/05/24  8:36 AM   Result Value Ref Range    Albumin, Urine Random 18.2 Not established mg/L    Creatinine, Urine Random 96.5 20.0 - 320.0 mg/dL    Albumin/Creatine Ratio 18.9 <30.0 ug/mg Creat   Vitamin D 25-Hydroxy,Total (for eval of Vitamin D levels)    Collection Time: 04/05/24  8:36 AM   Result Value Ref Range    Vitamin D, 25-Hydroxy, Total 21 (L) 30 - 100 ng/mL   POCT glycosylated hemoglobin (Hb A1C) manually resulted    Collection Time: 04/18/24  9:29 AM   Result Value Ref Range    POC HEMOGLOBIN A1c 7.7 (A) 4.2 - 6.5 %        Objective    Visit Vitals    Visit Vitals  /82   Pulse 86   Temp 36.7 °C (98 °F)   Resp 18   Ht 1.702 m (5' 7\")   Wt 107 kg (235 lb)   BMI 36.81 kg/m²   OB Status Having periods   Smoking Status Never   BSA 2.25 m²       Body mass index is 36.81 kg/m².     Physical Exam     General - Not in acute distress and cooperative.  Build & Nutrition - Well developed  Posture - Normal  Gait - Normal  Mental Status - alert and oriented x 3    Head - Normocephalic    Neck - Thyroid normal size    Eyes - Bilateral - Sclera clear and lids pink without edema or mass.      Skin - Warm and dry with no rashes on visible skin    Lungs - Clear to auscultation and normal breathing effort    Cardiovascular - RRR and no murmurs, rubs or thrill.    Peripheral Vascular - Bilateral - no edema present    Neuropsychiatric - normal mood and affect    Left elbow-patient has tenderness over the lateral epicondyle and into the extensor muscles of the forearm.  There is no erythema or swelling.  She has normal range of motion.  Squeezing and movement against resistance recreate " her pain.  Assessment    1. Breast cancer screening by mammogram  BI mammo bilateral screening tomosynthesis   She is due for mammogram and I will order this today.     2. Gastro-esophageal reflux disease without esophagitis  pantoprazole (ProtoNix) 40 mg EC tablet   Condition well controlled.  No change in current treatment regimen.  Refill given of current medication.  Make a follow up appointment with me for recheck in 6 months.       3. Type 2 diabetes mellitus without complication, without long-term current use of insulin (Multi)  semaglutide 0.25 mg or 0.5 mg (2 mg/3 mL) pen injector, empagliflozin (Jardiance) 25 mg   Her A1c is not well-controlled as above.  We discussed options and I want to discontinue metformin and changed to semaglutide 0.25 mg subcutaneously once a week.  I think this will help get her A1c under better control and should also help with weight loss.  We discussed the pros and cons of this medication and the patient would like to try it.  I recommend that she start using it when she gets it from the pharmacy and she should call us in 3 weeks with an update to let us know how she is doing.  At that point I will send in the next month prescription for 0.5 mg weekly.  We will follow-up in 2 months for recheck.     4. Lateral epicondylitis of left elbow     Explained epicondylitis in detail.  Recommend ice BID for 20 minutes.   Explained stretches to help.  Discussed possible recurrent nature of problem and how to minimize recurrence risk.  Recommend OTC Velcro Tennis Elbow strap when active.  Recommend to monitor and RTO in 2-3 weeks if not significantly improved or worsening.  I printed a handout with home stretches and exercises.         Orders Placed This Encounter      empagliflozin (Jardiance) 25 mg      pantoprazole (ProtoNix) 40 mg EC tablet      semaglutide 0.25 mg or 0.5 mg (2 mg/3 mL) pen injector       Orders Placed This Encounter   Procedures    BI mammo bilateral screening  tomosynthesis        New Medications Ordered This Visit   Medications    pantoprazole (ProtoNix) 40 mg EC tablet     Sig: Take 1 tablet (40 mg) by mouth once daily. Do not crush, chew, or split.     Dispense:  90 tablet     Refill:  1    semaglutide 0.25 mg or 0.5 mg (2 mg/3 mL) pen injector     Sig: Inject 0.25 mg under the skin 1 (one) time per week.     Dispense:  3 mL     Refill:  0    empagliflozin (Jardiance) 25 mg     Sig: Take 1 tablet (25 mg) by mouth once daily.     Dispense:  90 tablet     Refill:  1

## 2024-04-19 ENCOUNTER — OFFICE VISIT (OUTPATIENT)
Dept: OBSTETRICS AND GYNECOLOGY | Facility: CLINIC | Age: 49
End: 2024-04-19
Payer: COMMERCIAL

## 2024-04-19 VITALS
WEIGHT: 234 LBS | BODY MASS INDEX: 36.73 KG/M2 | DIASTOLIC BLOOD PRESSURE: 62 MMHG | SYSTOLIC BLOOD PRESSURE: 110 MMHG | HEIGHT: 67 IN

## 2024-04-19 DIAGNOSIS — N95.1 VASOMOTOR SYMPTOMS DUE TO MENOPAUSE: Primary | ICD-10-CM

## 2024-04-19 DIAGNOSIS — Z01.419 WELL WOMAN EXAM: ICD-10-CM

## 2024-04-19 DIAGNOSIS — N95.2 VAGINAL ATROPHY: ICD-10-CM

## 2024-04-19 DIAGNOSIS — N89.8 VAGINAL ITCHING: ICD-10-CM

## 2024-04-19 PROCEDURE — 3061F NEG MICROALBUMINURIA REV: CPT | Performed by: ADVANCED PRACTICE MIDWIFE

## 2024-04-19 PROCEDURE — 87186 SC STD MICRODIL/AGAR DIL: CPT

## 2024-04-19 PROCEDURE — 87102 FUNGUS ISOLATION CULTURE: CPT

## 2024-04-19 PROCEDURE — 1036F TOBACCO NON-USER: CPT | Performed by: ADVANCED PRACTICE MIDWIFE

## 2024-04-19 PROCEDURE — 3074F SYST BP LT 130 MM HG: CPT | Performed by: ADVANCED PRACTICE MIDWIFE

## 2024-04-19 PROCEDURE — 3049F LDL-C 100-129 MG/DL: CPT | Performed by: ADVANCED PRACTICE MIDWIFE

## 2024-04-19 PROCEDURE — 3078F DIAST BP <80 MM HG: CPT | Performed by: ADVANCED PRACTICE MIDWIFE

## 2024-04-19 PROCEDURE — 99386 PREV VISIT NEW AGE 40-64: CPT | Performed by: ADVANCED PRACTICE MIDWIFE

## 2024-04-19 RX ORDER — ESTRADIOL 0.1 MG/G
2 CREAM VAGINAL NIGHTLY
Qty: 34 G | Refills: 3 | Status: SHIPPED | OUTPATIENT
Start: 2024-04-19 | End: 2025-04-19

## 2024-04-19 ASSESSMENT — ENCOUNTER SYMPTOMS
NAUSEA: 0
ABDOMINAL PAIN: 0
HEADACHES: 0
DIARRHEA: 0
DYSURIA: 0
BACK PAIN: 1
SORE THROAT: 0
FEVER: 0
VOMITING: 0
CONSTIPATION: 0
FREQUENCY: 0
CHILLS: 0
FLANK PAIN: 0
HEMATURIA: 0

## 2024-04-19 NOTE — PROGRESS NOTES
Assessment/Plan   Problem List Items Addressed This Visit             ICD-10-CM       Medium    Vaginal atrophy N95.2    Relevant Medications    estradiol (Estrace) 0.01 % (0.1 mg/gram) vaginal cream    Vaginal itching N89.8    Relevant Orders    Fungal Screen, Yeast    Vasomotor symptoms due to menopause - Primary N95.1     Referral to [facility]  Info given on menopause retreat           Relevant Orders    Referral to Gynecology    Well woman exam Z01.419     Pap due 2027              Indira MATTHEWS Azeem, APRN-CNM     Subjective   Molly Martins is a 49 y.o. female who is here for a routine exam.     Here to establish care     Last pap @ [facility] in 2022, NILM/HPV neg   Menopausal issues:  +hot flashes  +brain fog  + vaginal discomfort/itching  +sleeping issues/fatigue  Answers submitted by the patient for this visit:  Female Genital Questionnaire (Submitted on 4/12/2024)  Chief Complaint: Female genitourinary complaint  genital itching: Yes  genital lesions: No  genital odor: No  genital rash: No  missed menses: Yes  vaginal bleeding: No  Chronicity: new  Onset: more than 1 year ago  Frequency: constantly  Progression since onset: unchanged  Severity of pain: mild  Affected side: both  Pregnant now?: No  anorexia: No  discolored urine: No  joint pain: Yes  joint swelling: No  painful intercourse: No  Aggravated by: nothing  Sexual activity: not sexually active  Partner with STD symptoms: no  Birth control: nothing, vasectomy  Menstrual history: postmenopausal    Mood has been ok     Denies dysuria     Last pap NILM/HPV negative     Lives with:  and 2 kids   Current employment: works @ in [employer]   T/E/D: never/rare ETOH/denies   Up to date vision/dental: yes   PCP: yes   Menstrual history: menopause x 2 years   Sexual history: monogamous partner x 24 years  Denies DV   Pregnancy history:  G/P 11/2  T: 2 P:  EAB:   Ectopic:  1 SAB: 8   L:  2    Diet: balanced  Exercise: walking daily     PMH,  "PSH, and Family hx reviewed and updated    Current contraception: post menopausal status  Refill Y/N:    Last pap: 2022  History of abnormal Pap smear: no  Family history of breast, uterine,  ovarian cancer: yes - maternal grandmother   Regular self breast exam: no  Last mammogram: has one scheduled   History of abnormal mammogram: yes - dense breasts          Review of Systems   Constitutional:  Negative for chills and fever.   HENT:  Negative for sore throat.    Gastrointestinal:  Negative for abdominal pain, constipation, diarrhea, nausea and vomiting.   Genitourinary:  Negative for dysuria, flank pain, frequency, hematuria, pelvic pain, urgency and vaginal discharge.   Musculoskeletal:  Positive for back pain.   Skin:  Negative for rash.   Neurological:  Negative for headaches.       Objective   /62   Ht 1.702 m (5' 7\")   Wt 106 kg (234 lb)   BMI 36.65 kg/m²     Physical Exam  Constitutional:       Appearance: Normal appearance.   HENT:      Head: Normocephalic.   Neck:      Thyroid: No thyroid mass, thyromegaly or thyroid tenderness.   Cardiovascular:      Rate and Rhythm: Normal rate and regular rhythm.   Pulmonary:      Effort: Pulmonary effort is normal.      Breath sounds: Normal breath sounds.   Chest:   Breasts:     Right: Normal. No mass, nipple discharge or tenderness.      Left: Normal. No mass, nipple discharge or tenderness.   Abdominal:      Palpations: Abdomen is soft.   Genitourinary:     Vagina: Normal.      Cervix: Normal.          Comments: Significant atrophy with excoriation at vaginal opening    Erythema on bilateral labial minora   Musculoskeletal:         General: Normal range of motion.   Lymphadenopathy:      Upper Body:      Right upper body: No axillary adenopathy.      Left upper body: No axillary adenopathy.   Skin:     General: Skin is warm and dry.   Neurological:      Mental Status: She is alert and oriented to person, place, and time.   Psychiatric:         Mood and " Affect: Mood normal.

## 2024-04-23 ENCOUNTER — HOSPITAL ENCOUNTER (OUTPATIENT)
Dept: RADIOLOGY | Facility: CLINIC | Age: 49
Discharge: HOME | End: 2024-04-23
Payer: COMMERCIAL

## 2024-04-23 VITALS — BODY MASS INDEX: 36.26 KG/M2 | HEIGHT: 67 IN | WEIGHT: 231 LBS

## 2024-04-23 DIAGNOSIS — Z12.31 BREAST CANCER SCREENING BY MAMMOGRAM: ICD-10-CM

## 2024-04-23 PROCEDURE — 77067 SCR MAMMO BI INCL CAD: CPT

## 2024-04-23 PROCEDURE — 77067 SCR MAMMO BI INCL CAD: CPT | Performed by: RADIOLOGY

## 2024-04-23 PROCEDURE — 77063 BREAST TOMOSYNTHESIS BI: CPT | Performed by: RADIOLOGY

## 2024-04-25 LAB — YEAST SPEC QL CULT: ABNORMAL

## 2024-04-26 ENCOUNTER — TELEPHONE (OUTPATIENT)
Dept: OBSTETRICS AND GYNECOLOGY | Facility: CLINIC | Age: 49
End: 2024-04-26
Payer: COMMERCIAL

## 2024-04-26 NOTE — TELEPHONE ENCOUNTER
----- Message from Indira Gann, APRN-CNM sent at 4/26/2024  3:18 PM EDT -----  I was going to try to send her Fluconazole, but she only has a mail order listed     4/30/24 0818 Pt is aware Indira sent fluconazole to her pharmacy.

## 2024-04-29 DIAGNOSIS — B37.9 YEAST INFECTION: Primary | ICD-10-CM

## 2024-04-29 DIAGNOSIS — B37.9 YEAST INFECTION: ICD-10-CM

## 2024-04-29 RX ORDER — FLUCONAZOLE 150 MG/1
150 TABLET ORAL ONCE
Qty: 1 TABLET | Refills: 1 | OUTPATIENT
Start: 2024-04-29 | End: 2024-04-29

## 2024-04-29 RX ORDER — FLUCONAZOLE 150 MG/1
150 TABLET ORAL ONCE
Qty: 2 TABLET | Refills: 0 | Status: SHIPPED | OUTPATIENT
Start: 2024-04-29 | End: 2024-04-29

## 2024-04-29 NOTE — TELEPHONE ENCOUNTER
----- Message from Indira Gann APRN-CNM sent at 4/26/2024  3:18 PM EDT -----  I was going to try to send her Fluconazole, but she only has a mail order listed

## 2024-04-29 NOTE — TELEPHONE ENCOUNTER
Upon chart review, pt uses [facility]  @ [phone].  Order placed in system and sent to CNM to approve.

## 2024-04-29 NOTE — TELEPHONE ENCOUNTER
Pt contacted and is aware of + yeast result.    Pt knows once provider signs off on the RX, she can/will  at [facility] pharmacy.

## 2024-05-10 DIAGNOSIS — E11.9 TYPE 2 DIABETES MELLITUS WITHOUT COMPLICATION, WITHOUT LONG-TERM CURRENT USE OF INSULIN (MULTI): ICD-10-CM

## 2024-05-10 NOTE — TELEPHONE ENCOUNTER
Pt called stating she has been on Ozempic for 3 weeks now has not lost any weight. She reports her BS has been in normal range. She is requesting the next dose to be ordered. Please advise.

## 2024-06-05 DIAGNOSIS — E11.9 TYPE 2 DIABETES MELLITUS WITHOUT COMPLICATION, WITHOUT LONG-TERM CURRENT USE OF INSULIN (MULTI): Primary | ICD-10-CM

## 2024-06-05 DIAGNOSIS — E11.9 TYPE 2 DIABETES MELLITUS WITHOUT COMPLICATION, WITHOUT LONG-TERM CURRENT USE OF INSULIN (MULTI): ICD-10-CM

## 2024-06-05 NOTE — PROGRESS NOTES
Molly Martins is a 49 y.o. female here today for No chief complaint on file.       HPI         Current Outpatient Medications:     clonazePAM (KlonoPIN) 0.25 mg disintegrating tablet, Take 1 tablet (0.25 mg) by mouth if needed., Disp: , Rfl:     empagliflozin (Jardiance) 25 mg, Take 1 tablet (25 mg) by mouth once daily., Disp: 90 tablet, Rfl: 1    estradiol (Estrace) 0.01 % (0.1 mg/gram) vaginal cream, Insert 0.5 Applicatorfuls (2 g) into the vagina once daily at bedtime. At bedtime for 2 weeks, then at bedtime twice a week., Disp: 34 g, Rfl: 3    pantoprazole (ProtoNix) 40 mg EC tablet, Take 1 tablet (40 mg) by mouth once daily. Do not crush, chew, or split., Disp: 90 tablet, Rfl: 1    semaglutide 0.25 mg or 0.5 mg (2 mg/3 mL) pen injector, Inject 0.5 mg under the skin 1 (one) time per week., Disp: 3 mL, Rfl: 0    sertraline (Zoloft) 100 mg tablet, TAKE 2 TABLETS DAILY, Disp: 180 tablet, Rfl: 0    Patient Active Problem List   Diagnosis    Abdominal bloating    Abnormal mammogram    Benign esophageal stricture    Generalized anxiety disorder    Bilateral arm weakness    Bilateral high frequency sensorineural hearing loss    Carpal tunnel syndrome    Type 2 diabetes mellitus without complication, without long-term current use of insulin (Multi)    Dysfunction of right eustachian tube    Early menopause occurring in patient age younger than 45 years    Fatigue    GERD without esophagitis    Hearing loss    Moderate obstructive sleep apnea    Shakiness    Tinnitus of both ears    Vitamin D deficiency    Weight gain    Class 2 severe obesity with serious comorbidity and body mass index (BMI) of 38.0 to 38.9 in adult (Multi)    Cervicalgia    Left shoulder pain    Polyhydramnios (HHS-HCC)    Symptomatic cholelithiasis    Vaginal itching    Vasomotor symptoms due to menopause    Vaginal atrophy    Well woman exam         No results found for this or any previous visit (from the past 672 hour(s)).     Objective     Visit Vitals    Visit Vitals  OB Status Menopausal   Smoking Status Never       There is no height or weight on file to calculate BMI.     Physical Exam       Assessment    No diagnosis found.            No orders of the defined types were placed in this encounter.       No orders of the defined types were placed in this encounter.

## 2024-06-05 NOTE — TELEPHONE ENCOUNTER
Patient called to give an update on her Semaglutide. She has not lost any weight, almost feels as she has gained weight since starting and her BS have been running in the 120s. She is asking if she should go up in dose. Please advise.

## 2024-06-19 ENCOUNTER — APPOINTMENT (OUTPATIENT)
Dept: PRIMARY CARE | Facility: CLINIC | Age: 49
End: 2024-06-19
Payer: COMMERCIAL

## 2024-06-24 ENCOUNTER — APPOINTMENT (OUTPATIENT)
Dept: PRIMARY CARE | Facility: CLINIC | Age: 49
End: 2024-06-24
Payer: COMMERCIAL

## 2024-06-24 VITALS
HEIGHT: 67 IN | SYSTOLIC BLOOD PRESSURE: 118 MMHG | TEMPERATURE: 98 F | DIASTOLIC BLOOD PRESSURE: 80 MMHG | BODY MASS INDEX: 35.94 KG/M2 | HEART RATE: 89 BPM | WEIGHT: 229 LBS | RESPIRATION RATE: 16 BRPM

## 2024-06-24 DIAGNOSIS — E11.9 TYPE 2 DIABETES MELLITUS WITHOUT COMPLICATION, WITHOUT LONG-TERM CURRENT USE OF INSULIN (MULTI): ICD-10-CM

## 2024-06-24 DIAGNOSIS — E66.01 CLASS 2 SEVERE OBESITY DUE TO EXCESS CALORIES WITH SERIOUS COMORBIDITY AND BODY MASS INDEX (BMI) OF 38.0 TO 38.9 IN ADULT (MULTI): Primary | ICD-10-CM

## 2024-06-24 PROBLEM — N91.2 AMENORRHEA: Status: ACTIVE | Noted: 2024-06-24

## 2024-06-24 PROBLEM — M77.12 LATERAL EPICONDYLITIS OF LEFT ELBOW: Status: ACTIVE | Noted: 2024-06-24

## 2024-06-24 PROBLEM — R40.0 DAYTIME SOMNOLENCE: Status: ACTIVE | Noted: 2024-06-24

## 2024-06-24 LAB — POC HEMOGLOBIN A1C: 7.6 % (ref 4.2–6.5)

## 2024-06-24 PROCEDURE — 3074F SYST BP LT 130 MM HG: CPT | Performed by: FAMILY MEDICINE

## 2024-06-24 PROCEDURE — 3079F DIAST BP 80-89 MM HG: CPT | Performed by: FAMILY MEDICINE

## 2024-06-24 PROCEDURE — 3008F BODY MASS INDEX DOCD: CPT | Performed by: FAMILY MEDICINE

## 2024-06-24 PROCEDURE — 83036 HEMOGLOBIN GLYCOSYLATED A1C: CPT | Performed by: FAMILY MEDICINE

## 2024-06-24 PROCEDURE — 99213 OFFICE O/P EST LOW 20 MIN: CPT | Performed by: FAMILY MEDICINE

## 2024-06-24 PROCEDURE — 3061F NEG MICROALBUMINURIA REV: CPT | Performed by: FAMILY MEDICINE

## 2024-06-24 PROCEDURE — 1036F TOBACCO NON-USER: CPT | Performed by: FAMILY MEDICINE

## 2024-06-24 PROCEDURE — 3049F LDL-C 100-129 MG/DL: CPT | Performed by: FAMILY MEDICINE

## 2024-06-24 NOTE — PROGRESS NOTES
Molly Martins is a 49 y.o. female here today for   Chief Complaint   Patient presents with    Diabetes     2 month recheck         HPI     Diabetes recheck -- Patient feeling well.  No CP, numbness of feet, blurred vision, polyuria.  Trying to follow diet.  No side effects from medications.  No hypoglycemic symptoms.   Doing well with Ozempic.  Some decreased appetite.  Has lost 5 lbs in the last 2 months.  A1C is 7.6 today.  No constipation or nausea.  No problems with Jardiance either.  She is trying to watch her diet more closely and has been making better decisions.           Current Outpatient Medications:     clonazePAM (KlonoPIN) 0.25 mg disintegrating tablet, Take 1 tablet (0.25 mg) by mouth if needed., Disp: , Rfl:     empagliflozin (Jardiance) 25 mg, Take 1 tablet (25 mg) by mouth once daily., Disp: 90 tablet, Rfl: 1    estradiol (Estrace) 0.01 % (0.1 mg/gram) vaginal cream, Insert 0.5 Applicatorfuls (2 g) into the vagina once daily at bedtime. At bedtime for 2 weeks, then at bedtime twice a week., Disp: 34 g, Rfl: 3    pantoprazole (ProtoNix) 40 mg EC tablet, Take 1 tablet (40 mg) by mouth once daily. Do not crush, chew, or split., Disp: 90 tablet, Rfl: 1    sertraline (Zoloft) 100 mg tablet, TAKE 2 TABLETS DAILY, Disp: 180 tablet, Rfl: 0    [START ON 6/30/2024] semaglutide 2 mg/dose (8 mg/3 mL) pen injector, Inject 2 mg under the skin 1 (one) time per week., Disp: 3 mL, Rfl: 1    Patient Active Problem List   Diagnosis    Abdominal bloating    Abnormal mammogram    Benign esophageal stricture    Generalized anxiety disorder    Bilateral arm weakness    Bilateral high frequency sensorineural hearing loss    Carpal tunnel syndrome    Type 2 diabetes mellitus without complication, without long-term current use of insulin (Multi)    Dysfunction of right eustachian tube    Early menopause occurring in patient age younger than 45 years    Fatigue    GERD without esophagitis    Hearing loss    Moderate  "obstructive sleep apnea    Shakiness    Tinnitus of both ears    Vitamin D deficiency    Weight gain    Class 2 severe obesity with serious comorbidity and body mass index (BMI) of 38.0 to 38.9 in adult (Multi)    Cervicalgia    Left shoulder pain    Polyhydramnios (HHS-HCC)    Symptomatic cholelithiasis    Vaginal itching    Vasomotor symptoms due to menopause    Vaginal atrophy    Well woman exam    Amenorrhea    Daytime somnolence    Lateral epicondylitis of left elbow         Recent Results (from the past 672 hour(s))   POCT glycosylated hemoglobin (Hb A1C) manually resulted    Collection Time: 06/24/24 10:40 AM   Result Value Ref Range    POC HEMOGLOBIN A1c 7.6 (A) 4.2 - 6.5 %        Objective    Visit Vitals    Visit Vitals  /80   Pulse 89   Temp 36.7 °C (98 °F)   Resp 16   Ht 1.702 m (5' 7\")   Wt 104 kg (229 lb)   BMI 35.87 kg/m²   OB Status Menopausal   Smoking Status Never   BSA 2.22 m²       Body mass index is 35.87 kg/m².     Physical Exam   General - Not in acute distress and cooperative.  Build & Nutrition - Well developed  Posture - Normal  Gait - Normal  Mental Status - alert and oriented x 3    Head - Normocephalic    Neck - Thyroid normal size    Eyes - Bilateral - Sclera clear and lids pink without edema or mass.      Skin - Warm and dry with no rashes on visible skin    Lungs - Clear to auscultation and normal breathing effort    Cardiovascular - RRR and no murmurs, rubs or thrill.    Peripheral Vascular - Bilateral - no edema present    Neuropsychiatric - normal mood and affect        Assessment    1. Type 2 diabetes mellitus without complication, without long-term current use of insulin (Multi)  POCT glycosylated hemoglobin (Hb A1C) manually resulted, semaglutide 2 mg/dose (8 mg/3 mL) pen injector   Her weight is coming down slowly and her A1c is improving at 7.6 today.  We still have suboptimal control and we discussed options.  We will increase Ozempic up to 2 mg weekly and follow-up in " 2 months.  She will continue the same Jardiance dose.  We also discussed diet further and how to avoid eating too late in the day.         Orders Placed This Encounter      semaglutide 2 mg/dose (8 mg/3 mL) pen injector       Orders Placed This Encounter   Procedures    POCT glycosylated hemoglobin (Hb A1C) manually resulted        New Medications Ordered This Visit   Medications    semaglutide 2 mg/dose (8 mg/3 mL) pen injector     Sig: Inject 2 mg under the skin 1 (one) time per week.     Dispense:  3 mL     Refill:  1

## 2024-07-23 ENCOUNTER — TELEPHONE (OUTPATIENT)
Dept: PRIMARY CARE | Facility: CLINIC | Age: 49
End: 2024-07-23
Payer: COMMERCIAL

## 2024-07-23 DIAGNOSIS — E11.9 TYPE 2 DIABETES MELLITUS WITHOUT COMPLICATION, WITHOUT LONG-TERM CURRENT USE OF INSULIN (MULTI): ICD-10-CM

## 2024-07-23 NOTE — TELEPHONE ENCOUNTER
Patient calling she LVM for PCP team that she cancelled a refill request [facility] for following medication   semaglutide 2 mg/dose (8 mg/3 mL) pen injector   She needs this sent to [facility] in [place] instead.

## 2024-07-23 NOTE — TELEPHONE ENCOUNTER
Pt called regarding update on 2mg Ozempic, she reports no weight change and BS has been running 120-130s. She is asking what the next step is?   
4

## 2024-08-19 ENCOUNTER — TELEPHONE (OUTPATIENT)
Dept: PRIMARY CARE | Facility: CLINIC | Age: 49
End: 2024-08-19
Payer: COMMERCIAL

## 2024-08-19 DIAGNOSIS — E11.9 TYPE 2 DIABETES MELLITUS WITHOUT COMPLICATION, WITHOUT LONG-TERM CURRENT USE OF INSULIN (MULTI): ICD-10-CM

## 2024-08-19 NOTE — TELEPHONE ENCOUNTER
Pt left message stating she is currently on ozempic and there have been no weight loss. What steps are next?

## 2024-08-23 DIAGNOSIS — E11.9 TYPE 2 DIABETES MELLITUS WITHOUT COMPLICATION, WITHOUT LONG-TERM CURRENT USE OF INSULIN (MULTI): ICD-10-CM

## 2024-08-26 ENCOUNTER — APPOINTMENT (OUTPATIENT)
Dept: PRIMARY CARE | Facility: CLINIC | Age: 49
End: 2024-08-26
Payer: COMMERCIAL

## 2024-08-26 VITALS
SYSTOLIC BLOOD PRESSURE: 128 MMHG | DIASTOLIC BLOOD PRESSURE: 82 MMHG | WEIGHT: 226 LBS | HEIGHT: 67 IN | HEART RATE: 92 BPM | TEMPERATURE: 97 F | BODY MASS INDEX: 35.47 KG/M2 | RESPIRATION RATE: 18 BRPM

## 2024-08-26 DIAGNOSIS — E11.9 TYPE 2 DIABETES MELLITUS WITHOUT COMPLICATION, WITHOUT LONG-TERM CURRENT USE OF INSULIN (MULTI): ICD-10-CM

## 2024-08-26 DIAGNOSIS — L03.011 PARONYCHIA OF FINGER OF RIGHT HAND: Primary | ICD-10-CM

## 2024-08-26 LAB — POC HEMOGLOBIN A1C: 7.1 % (ref 4.2–6.5)

## 2024-08-26 PROCEDURE — 3061F NEG MICROALBUMINURIA REV: CPT | Performed by: FAMILY MEDICINE

## 2024-08-26 PROCEDURE — 3074F SYST BP LT 130 MM HG: CPT | Performed by: FAMILY MEDICINE

## 2024-08-26 PROCEDURE — 83036 HEMOGLOBIN GLYCOSYLATED A1C: CPT | Performed by: FAMILY MEDICINE

## 2024-08-26 PROCEDURE — 3079F DIAST BP 80-89 MM HG: CPT | Performed by: FAMILY MEDICINE

## 2024-08-26 PROCEDURE — 3008F BODY MASS INDEX DOCD: CPT | Performed by: FAMILY MEDICINE

## 2024-08-26 PROCEDURE — 1036F TOBACCO NON-USER: CPT | Performed by: FAMILY MEDICINE

## 2024-08-26 PROCEDURE — 99214 OFFICE O/P EST MOD 30 MIN: CPT | Performed by: FAMILY MEDICINE

## 2024-08-26 PROCEDURE — 3049F LDL-C 100-129 MG/DL: CPT | Performed by: FAMILY MEDICINE

## 2024-08-26 RX ORDER — SULFAMETHOXAZOLE AND TRIMETHOPRIM 800; 160 MG/1; MG/1
1 TABLET ORAL 2 TIMES DAILY
Qty: 10 TABLET | Refills: 0 | Status: SHIPPED | OUTPATIENT
Start: 2024-08-26 | End: 2024-08-26 | Stop reason: SDUPTHER

## 2024-08-26 RX ORDER — SULFAMETHOXAZOLE AND TRIMETHOPRIM 800; 160 MG/1; MG/1
1 TABLET ORAL 2 TIMES DAILY
Qty: 10 TABLET | Refills: 0 | Status: SHIPPED | OUTPATIENT
Start: 2024-08-26

## 2024-08-26 NOTE — PROGRESS NOTES
Molly Martins is a 49 y.o. female here today for   Chief Complaint   Patient presents with    Diabetes        Diabetes  She presents for her follow-up diabetic visit. She has type 2 diabetes mellitus.      Insurance is now not covering Ozempic and it is $900 per month so she cannot afford it.  But she called her insurance Friday and the insurance said it is covered and should be $20 per month.  Plans to call insurance again today.  Has lost about 3 lbs in the last 2 months.  A1C improved to 7.1 today.  Her A1c was 7.74 months ago when we started Ozempic.  She reports that she is tolerating the Ozempic very well with no side effects or problems.  She has noticed a decreased appetite.    Right 3rd finger ingrown nail.  She says she has been picking at it and now has a infection which is tender and swollen and occasionally drains pus.  She has been soaking it but it is not resolved over the last 3 to 4 days.      Current Outpatient Medications:     clonazePAM (KlonoPIN) 0.25 mg disintegrating tablet, Take 1 tablet (0.25 mg) by mouth if needed., Disp: , Rfl:     empagliflozin (Jardiance) 25 mg, Take 1 tablet (25 mg) by mouth once daily., Disp: 90 tablet, Rfl: 1    estradiol (Estrace) 0.01 % (0.1 mg/gram) vaginal cream, Insert 0.5 Applicatorfuls (2 g) into the vagina once daily at bedtime. At bedtime for 2 weeks, then at bedtime twice a week., Disp: 34 g, Rfl: 3    pantoprazole (ProtoNix) 40 mg EC tablet, Take 1 tablet (40 mg) by mouth once daily. Do not crush, chew, or split., Disp: 90 tablet, Rfl: 1    sertraline (Zoloft) 100 mg tablet, TAKE 2 TABLETS DAILY, Disp: 180 tablet, Rfl: 0    semaglutide 2 mg/dose (8 mg/3 mL) pen injector, Inject 2 mg under the skin 1 (one) time per week. (Patient not taking: Reported on 8/26/2024), Disp: 3 mL, Rfl: 1    sulfamethoxazole-trimethoprim (Bactrim DS) 800-160 mg tablet, Take 1 tablet by mouth 2 times a day., Disp: 10 tablet, Rfl: 0    Patient Active Problem List   Diagnosis     "Abdominal bloating    Abnormal mammogram    Benign esophageal stricture    Generalized anxiety disorder    Bilateral arm weakness    Bilateral high frequency sensorineural hearing loss    Carpal tunnel syndrome    Type 2 diabetes mellitus without complication, without long-term current use of insulin (Multi)    Dysfunction of right eustachian tube    Early menopause occurring in patient age younger than 45 years    Fatigue    GERD without esophagitis    Hearing loss    Moderate obstructive sleep apnea    Shakiness    Tinnitus of both ears    Vitamin D deficiency    Weight gain    Class 2 severe obesity with serious comorbidity and body mass index (BMI) of 38.0 to 38.9 in adult (Multi)    Cervicalgia    Left shoulder pain    Polyhydramnios ([facility]-HCC)    Symptomatic cholelithiasis    Vaginal itching    Vasomotor symptoms due to menopause    Vaginal atrophy    Well woman exam    Amenorrhea    Daytime somnolence    Lateral epicondylitis of left elbow         Recent Results (from the past 672 hour(s))   POCT glycosylated hemoglobin (Hb A1C) manually resulted    Collection Time: 08/26/24 10:54 AM   Result Value Ref Range    POC HEMOGLOBIN A1c 7.1 (A) 4.2 - 6.5 %        Objective    Visit Vitals    Visit Vitals  /82   Pulse 92   Temp 36.1 °C (97 °F)   Resp 18   Ht 1.702 m (5' 7\")   Wt 103 kg (226 lb)   BMI 35.40 kg/m²   OB Status Menopausal   Smoking Status Never   BSA 2.21 m²       Body mass index is 35.4 kg/m².     Physical Exam   Right third finger with a small paronychia on the lateral side but no evidence of spread or severe infection.    Assessment    1. Paronychia of finger of right hand  sulfamethoxazole-trimethoprim (Bactrim DS) 800-160 mg tablet, DISCONTINUED: sulfamethoxazole-trimethoprim (Bactrim DS) 800-160 mg tablet   I recommend she continue warm soaks twice daily and we will add Bactrim DS for 5 days.  I recommend to call us and make a follow up appointment if sxs worsen or do not resolve.  We also " discussed proper nail trimming and care to try to prevent recurrence.       2. Type 2 diabetes mellitus without complication, without long-term current use of insulin (Multi)  POCT glycosylated hemoglobin (Hb A1C) manually resulted   She is doing well with Ozempic and her A1c has improved.  I told her to keep working with her insurance and gave her the names of other alternatives in the same class.  If they will not cover it or it is too expensive she should call us and we can discuss it further.  We will continue at the same dose for now.  We will follow-up in 3 to 4 months for recheck.         Orders Placed This Encounter      sulfamethoxazole-trimethoprim (Bactrim DS) 800-160 mg tablet       Orders Placed This Encounter   Procedures    POCT glycosylated hemoglobin (Hb A1C) manually resulted        New Medications Ordered This Visit   Medications    sulfamethoxazole-trimethoprim (Bactrim DS) 800-160 mg tablet     Sig: Take 1 tablet by mouth 2 times a day.     Dispense:  10 tablet     Refill:  0

## 2024-11-11 DIAGNOSIS — K21.9 GASTRO-ESOPHAGEAL REFLUX DISEASE WITHOUT ESOPHAGITIS: ICD-10-CM

## 2024-11-11 RX ORDER — PANTOPRAZOLE SODIUM 40 MG/1
TABLET, DELAYED RELEASE ORAL
Qty: 90 TABLET | Refills: 0 | Status: SHIPPED | OUTPATIENT
Start: 2024-11-11

## 2024-11-19 ASSESSMENT — ENCOUNTER SYMPTOMS
HEADACHES: 0
CONFUSION: 0
POLYDIPSIA: 0
FATIGUE: 0
WEIGHT LOSS: 0
SPEECH DIFFICULTY: 0
WEAKNESS: 0
NERVOUS/ANXIOUS: 0
BLACKOUTS: 0
SEIZURES: 0
SWEATS: 0
VISUAL CHANGE: 0
POLYPHAGIA: 0
TREMORS: 0
DIZZINESS: 0
BLURRED VISION: 0
HUNGER: 0

## 2024-11-21 ENCOUNTER — OFFICE VISIT (OUTPATIENT)
Dept: OBSTETRICS AND GYNECOLOGY | Facility: CLINIC | Age: 49
End: 2024-11-21
Payer: COMMERCIAL

## 2024-11-21 VITALS
BODY MASS INDEX: 35.47 KG/M2 | HEIGHT: 67 IN | WEIGHT: 226 LBS | SYSTOLIC BLOOD PRESSURE: 106 MMHG | DIASTOLIC BLOOD PRESSURE: 68 MMHG

## 2024-11-21 DIAGNOSIS — N95.2 VAGINAL ATROPHY: ICD-10-CM

## 2024-11-21 DIAGNOSIS — B37.31 VAGINAL YEAST INFECTION: Primary | ICD-10-CM

## 2024-11-21 DIAGNOSIS — B37.9 YEAST INFECTION: Primary | ICD-10-CM

## 2024-11-21 PROCEDURE — 1036F TOBACCO NON-USER: CPT | Performed by: ADVANCED PRACTICE MIDWIFE

## 2024-11-21 PROCEDURE — 3008F BODY MASS INDEX DOCD: CPT | Performed by: ADVANCED PRACTICE MIDWIFE

## 2024-11-21 PROCEDURE — 99213 OFFICE O/P EST LOW 20 MIN: CPT | Performed by: ADVANCED PRACTICE MIDWIFE

## 2024-11-21 PROCEDURE — 87077 CULTURE AEROBIC IDENTIFY: CPT

## 2024-11-21 PROCEDURE — 3078F DIAST BP <80 MM HG: CPT | Performed by: ADVANCED PRACTICE MIDWIFE

## 2024-11-21 PROCEDURE — 3061F NEG MICROALBUMINURIA REV: CPT | Performed by: ADVANCED PRACTICE MIDWIFE

## 2024-11-21 PROCEDURE — 87102 FUNGUS ISOLATION CULTURE: CPT

## 2024-11-21 PROCEDURE — 3049F LDL-C 100-129 MG/DL: CPT | Performed by: ADVANCED PRACTICE MIDWIFE

## 2024-11-21 PROCEDURE — 87186 SC STD MICRODIL/AGAR DIL: CPT

## 2024-11-21 PROCEDURE — 3074F SYST BP LT 130 MM HG: CPT | Performed by: ADVANCED PRACTICE MIDWIFE

## 2024-11-21 RX ORDER — ESTRADIOL 0.1 MG/G
2 CREAM VAGINAL NIGHTLY
Qty: 34 G | Refills: 3 | Status: SHIPPED | OUTPATIENT
Start: 2024-11-21 | End: 2025-11-21

## 2024-11-21 RX ORDER — TERCONAZOLE 4 MG/G
1 CREAM VAGINAL ONCE
Status: DISCONTINUED | OUTPATIENT
Start: 2024-11-21 | End: 2024-11-22

## 2024-11-21 ASSESSMENT — PAIN SCALES - GENERAL: PAINLEVEL_OUTOF10: 0-NO PAIN

## 2024-11-21 NOTE — PROGRESS NOTES
Pt having vaginal itching, pain and discharge     Chaperone declined: Deann Gutierrez, CM3    Subjective   Patient ID: Molly Martins is a 49 y.o. female who presents for No chief complaint on file..  HPI    Pt. Here for vaginal itching  Onset a few months ago   +discharge  No odor    No changes hygiene product  Partner x 25 years    Treated for yeast in April, resolved after 2 doses of Diflucan     Was prescribed vaginal estrogen but forgot to pick it up from the pharmacy!     T2DM, on Ozempic, not much weight loss but A1C is improving       Review of Systems    Objective   Physical Exam  Constitutional:       Appearance: Normal appearance.   Genitourinary:     Vagina: Vaginal discharge present.          Comments: Tear noted on right labia minora     Generalized erythema and atrophy noted at introitus, microtears noted     Thick/caked white discharge    Neurological:      Mental Status: She is alert.         Assessment/Plan   Problem List Items Addressed This Visit             ICD-10-CM       Medium    Vaginal atrophy N95.2    Relevant Medications    estradiol (Estrace) 0.01 % (0.1 mg/gram) vaginal cream    Yeast infection - Primary B37.9     Fungal screen sent          Relevant Medications    terconazole (Terazol 7) 0.4 % vaginal cream 1 applicator            ILEANA Lizarraga 11/21/24 1:45 PM

## 2024-11-22 ENCOUNTER — TELEPHONE (OUTPATIENT)
Dept: OBSTETRICS AND GYNECOLOGY | Facility: CLINIC | Age: 49
End: 2024-11-22
Payer: COMMERCIAL

## 2024-11-22 DIAGNOSIS — N89.8 VAGINAL ITCHING: Primary | ICD-10-CM

## 2024-11-22 DIAGNOSIS — E11.9 TYPE 2 DIABETES MELLITUS WITHOUT COMPLICATION, WITHOUT LONG-TERM CURRENT USE OF INSULIN (MULTI): ICD-10-CM

## 2024-11-22 RX ORDER — TERCONAZOLE 8 MG/G
1 CREAM VAGINAL NIGHTLY
Qty: 20 G | Refills: 0 | OUTPATIENT
Start: 2024-11-22 | End: 2024-11-25

## 2024-11-22 RX ORDER — TERCONAZOLE 4 MG/G
1 CREAM VAGINAL NIGHTLY
Qty: 45 G | Refills: 0 | Status: SHIPPED | OUTPATIENT
Start: 2024-11-22 | End: 2024-11-29

## 2024-11-22 RX ORDER — EMPAGLIFLOZIN 25 MG/1
25 TABLET, FILM COATED ORAL DAILY
Qty: 90 TABLET | Refills: 0 | Status: SHIPPED | OUTPATIENT
Start: 2024-11-22

## 2024-11-22 NOTE — TELEPHONE ENCOUNTER
Pt contacted.   Needs script for terazol cream to go to [facility].     It was sent to mail order earlier by mistake.     Pt informed office will call it in today.

## 2024-11-22 NOTE — TELEPHONE ENCOUNTER
Pt contacted.  Pt informed office will ensure order will be cancelled thru [facility].  [facility] will then [facility] make [facility] order goes through

## 2024-11-24 LAB — YEAST SPEC QL CULT: ABNORMAL

## 2024-11-25 NOTE — TELEPHONE ENCOUNTER
Lauryn contacted in [facility] DEPT to request an add on for sensitivities to +yeast culture.      Await report.

## 2024-11-26 ENCOUNTER — APPOINTMENT (OUTPATIENT)
Dept: PRIMARY CARE | Facility: CLINIC | Age: 49
End: 2024-11-26
Payer: COMMERCIAL

## 2024-11-26 VITALS
RESPIRATION RATE: 16 BRPM | HEIGHT: 67 IN | DIASTOLIC BLOOD PRESSURE: 80 MMHG | TEMPERATURE: 97.2 F | BODY MASS INDEX: 35.79 KG/M2 | WEIGHT: 228 LBS | SYSTOLIC BLOOD PRESSURE: 126 MMHG | HEART RATE: 75 BPM

## 2024-11-26 DIAGNOSIS — K21.9 GASTRO-ESOPHAGEAL REFLUX DISEASE WITHOUT ESOPHAGITIS: ICD-10-CM

## 2024-11-26 DIAGNOSIS — E11.9 TYPE 2 DIABETES MELLITUS WITHOUT COMPLICATION, WITHOUT LONG-TERM CURRENT USE OF INSULIN (MULTI): ICD-10-CM

## 2024-11-26 DIAGNOSIS — Z23 IMMUNIZATION DUE: ICD-10-CM

## 2024-11-26 DIAGNOSIS — K22.2 BENIGN ESOPHAGEAL STRICTURE: Primary | ICD-10-CM

## 2024-11-26 LAB — POC HEMOGLOBIN A1C: 6.4 % (ref 4.2–6.5)

## 2024-11-26 PROCEDURE — 90656 IIV3 VACC NO PRSV 0.5 ML IM: CPT | Performed by: FAMILY MEDICINE

## 2024-11-26 PROCEDURE — 3008F BODY MASS INDEX DOCD: CPT | Performed by: FAMILY MEDICINE

## 2024-11-26 PROCEDURE — 1036F TOBACCO NON-USER: CPT | Performed by: FAMILY MEDICINE

## 2024-11-26 PROCEDURE — 99214 OFFICE O/P EST MOD 30 MIN: CPT | Performed by: FAMILY MEDICINE

## 2024-11-26 PROCEDURE — 3079F DIAST BP 80-89 MM HG: CPT | Performed by: FAMILY MEDICINE

## 2024-11-26 PROCEDURE — 83036 HEMOGLOBIN GLYCOSYLATED A1C: CPT | Performed by: FAMILY MEDICINE

## 2024-11-26 PROCEDURE — 3074F SYST BP LT 130 MM HG: CPT | Performed by: FAMILY MEDICINE

## 2024-11-26 PROCEDURE — 3049F LDL-C 100-129 MG/DL: CPT | Performed by: FAMILY MEDICINE

## 2024-11-26 PROCEDURE — 90471 IMMUNIZATION ADMIN: CPT | Performed by: FAMILY MEDICINE

## 2024-11-26 PROCEDURE — 3061F NEG MICROALBUMINURIA REV: CPT | Performed by: FAMILY MEDICINE

## 2024-11-26 RX ORDER — PANTOPRAZOLE SODIUM 40 MG/1
40 TABLET, DELAYED RELEASE ORAL
Qty: 90 TABLET | Refills: 1 | Status: SHIPPED | OUTPATIENT
Start: 2024-11-26

## 2024-11-26 ASSESSMENT — ENCOUNTER SYMPTOMS
SWEATS: 0
POLYDIPSIA: 0
SPEECH DIFFICULTY: 0
BLURRED VISION: 0
VISUAL CHANGE: 0
FATIGUE: 0
BLACKOUTS: 0
POLYPHAGIA: 0
WEAKNESS: 0
DIZZINESS: 0
HEADACHES: 0
CONFUSION: 0
NERVOUS/ANXIOUS: 0
SEIZURES: 0
HUNGER: 0
TREMORS: 0
WEIGHT LOSS: 0

## 2024-11-26 NOTE — PROGRESS NOTES
Molly Martins is a 49 y.o. female here today for   Chief Complaint   Patient presents with    Diabetes    GERD        Diabetes  She has type 2 diabetes mellitus. No MedicAlert identification noted. Pertinent negatives for hypoglycemia include no confusion, dizziness, headaches, hunger, mood changes, nervousness/anxiousness, pallor, seizures, sleepiness, speech difficulty, sweats or tremors. Pertinent negatives for diabetes include no blurred vision, no chest pain, no fatigue, no foot paresthesias, no foot ulcerations, no polydipsia, no polyphagia, no polyuria, no visual change, no weakness and no weight loss. Pertinent negatives for hypoglycemia complications include no blackouts, no hospitalization, no nocturnal hypoglycemia, no required assistance and no required glucagon injection. Symptoms are stable. Pertinent negatives for diabetic complications include no CVA, heart disease, impotence, nephropathy, peripheral neuropathy, PVD or retinopathy. There are no known risk factors for coronary artery disease. Current diabetic treatment includes oral agent (dual therapy). She is compliant with treatment most of the time. Her breakfast blood glucose is taken between 6-7 am.      Diabetes recheck -- Patient feeling well.  No CP, numbness of feet, blurred vision, polyuria.  Trying to follow diet.  No side effects from medications.  No hypoglycemic symptoms. A1C is improved to 6.4 today.  She has lost a total of about 7 pounds since we first started Ozempic in April.  She says she does not really notice a big effect on her diet or appetite with this medication.    GERD recheck -- Patient reports GI symptoms are well controlled with current treatment.  No heartburn, chest pain, vomiting, diarrhea.  No SE's from current treatment.  Patient wishes to continue the same treatment.   Takes pantoprazole daily.  Had some increased reflux a few weeks ago.  Has not seen [facility] for a few years.  No food sticking.      Mood  disorder is managed by her psychiatrist.  She reports it has been stable and well-controlled.  She is still not sleeping very well.  She plans to discuss this with her psychiatrist.      Current Outpatient Medications:     clonazePAM (KlonoPIN) 0.25 mg disintegrating tablet, Dissolve 1 tablet (0.25 mg) in the mouth if needed., Disp: , Rfl:     estradiol (Estrace) 0.01 % (0.1 mg/gram) vaginal cream, Insert 0.5 Applicatorfuls (2 g) into the vagina once daily at bedtime. At bedtime for 2 weeks, then at bedtime twice a week., Disp: 34 g, Rfl: 3    sertraline (Zoloft) 100 mg tablet, TAKE 2 TABLETS DAILY (Patient taking differently: Take 1 tablet (100 mg) by mouth once daily.), Disp: 180 tablet, Rfl: 0    terconazole (Terazol 7) 0.4 % vaginal cream, Insert 1 applicator into the vagina once daily at bedtime for 7 days., Disp: 45 g, Rfl: 0    empagliflozin (Jardiance) 25 mg, Take 1 tablet (25 mg) by mouth once daily., Disp: 90 tablet, Rfl: 1    pantoprazole (ProtoNix) 40 mg EC tablet, Take 1 tablet (40 mg) by mouth once daily in the morning. Take before meals. Do not crush, chew, or split., Disp: 90 tablet, Rfl: 1    semaglutide 2 mg/dose (8 mg/3 mL) pen injector, Inject 2 mg under the skin 1 (one) time per week., Disp: 9 mL, Rfl: 1    Patient Active Problem List   Diagnosis    Abdominal bloating    Abnormal mammogram    Benign esophageal stricture    Generalized anxiety disorder    Bilateral arm weakness    Bilateral high frequency sensorineural hearing loss    Carpal tunnel syndrome    Type 2 diabetes mellitus without complication, without long-term current use of insulin (Multi)    Dysfunction of right eustachian tube    Early menopause occurring in patient age younger than 45 years    Fatigue    GERD without esophagitis    Hearing loss    Moderate obstructive sleep apnea    Shakiness    Tinnitus of both ears    Vitamin D deficiency    Weight gain    Class 2 severe obesity with serious comorbidity and body mass index  "(BMI) of 38.0 to 38.9 in adult    Cervicalgia    Left shoulder pain    Polyhydramnios    Symptomatic cholelithiasis    Vaginal itching    Vasomotor symptoms due to menopause    Vaginal atrophy    Well woman exam    Amenorrhea    Daytime somnolence    Lateral epicondylitis of left elbow    Yeast infection              Objective    Visit Vitals    Visit Vitals  /80   Pulse 75   Temp 36.2 °C (97.2 °F)   Resp 16   Ht 1.702 m (5' 7\")   Wt 103 kg (228 lb)   BMI 35.71 kg/m²   OB Status Menopausal   Smoking Status Never   BSA 2.21 m²       Body mass index is 35.71 kg/m².     Physical Exam   General - Not in acute distress and cooperative.  Build & Nutrition - Well developed  Posture - Normal  Gait - Normal  Mental Status - alert and oriented x 3    Head - Normocephalic    Neck - Thyroid normal size    Eyes - Bilateral - Sclera clear and lids pink without edema or mass.      Skin - Warm and dry with no rashes on visible skin    Lungs - Clear to auscultation and normal breathing effort    Cardiovascular - RRR and no murmurs, rubs or thrill.    Peripheral Vascular - Bilateral - no edema present    Neuropsychiatric - normal mood and affect          Assessment & Plan  Type 2 diabetes mellitus without complication, without long-term current use of insulin (Multi)  Condition well controlled.  No change in current treatment regimen.  Refill given of current medication.  Appropriate labs ordered or reviewed.  Make a follow up appointment with me for recheck in 6 months.  Orders:    POCT glycosylated hemoglobin (Hb A1C) manually resulted    empagliflozin (Jardiance) 25 mg; Take 1 tablet (25 mg) by mouth once daily.    semaglutide 2 mg/dose (8 mg/3 mL) pen injector; Inject 2 mg under the skin 1 (one) time per week.    Gastro-esophageal reflux disease without esophagitis  We will continue the pantoprazole.  I recommend that she call and set up a follow-up appointment with her gastroenterologist since she is noticing some " increased reflux even on pantoprazole.  She may need another EGD to check for any dangerous findings.  She says she will call him this week.  Orders:    pantoprazole (ProtoNix) 40 mg EC tablet; Take 1 tablet (40 mg) by mouth once daily in the morning. Take before meals. Do not crush, chew, or split.    Immunization due    Orders:    Flu vaccine, trivalent, preservative free, age 6 months and greater (Fluarix/Fluzone/Flulaval)    Benign esophageal stricture  As above.            Orders Placed This Encounter      empagliflozin (Jardiance) 25 mg      pantoprazole (ProtoNix) 40 mg EC tablet      semaglutide 2 mg/dose (8 mg/3 mL) pen injector       Orders Placed This Encounter   Procedures    Flu vaccine, trivalent, preservative free, age 6 months and greater (Fluarix/Fluzone/Flulaval)    POCT glycosylated hemoglobin (Hb A1C) manually resulted        New Medications Ordered This Visit   Medications    empagliflozin (Jardiance) 25 mg     Sig: Take 1 tablet (25 mg) by mouth once daily.     Dispense:  90 tablet     Refill:  1    pantoprazole (ProtoNix) 40 mg EC tablet     Sig: Take 1 tablet (40 mg) by mouth once daily in the morning. Take before meals. Do not crush, chew, or split.     Dispense:  90 tablet     Refill:  1    semaglutide 2 mg/dose (8 mg/3 mL) pen injector     Sig: Inject 2 mg under the skin 1 (one) time per week.     Dispense:  9 mL     Refill:  1

## 2024-11-26 NOTE — ASSESSMENT & PLAN NOTE
Condition well controlled.  No change in current treatment regimen.  Refill given of current medication.  Appropriate labs ordered or reviewed.  Make a follow up appointment with me for recheck in 6 months.  Orders:    POCT glycosylated hemoglobin (Hb A1C) manually resulted    empagliflozin (Jardiance) 25 mg; Take 1 tablet (25 mg) by mouth once daily.    semaglutide 2 mg/dose (8 mg/3 mL) pen injector; Inject 2 mg under the skin 1 (one) time per week.

## 2024-11-27 LAB — YEAST SPEC QL CULT: ABNORMAL

## 2024-11-29 ENCOUNTER — TELEPHONE (OUTPATIENT)
Dept: OBSTETRICS AND GYNECOLOGY | Facility: CLINIC | Age: 49
End: 2024-11-29
Payer: COMMERCIAL

## 2024-11-29 NOTE — TELEPHONE ENCOUNTER
----- Message from Indira Gann sent at 11/27/2024  4:14 PM EST -----  Should respond to that 7 day Terazol

## 2024-11-29 NOTE — TELEPHONE ENCOUNTER
Pt contacted.   Discussed yeast culture and sensitivities result.  Pt endorses much improvement with the Terazol cream usage.

## 2024-12-11 ENCOUNTER — APPOINTMENT (OUTPATIENT)
Dept: OBSTETRICS AND GYNECOLOGY | Facility: CLINIC | Age: 49
End: 2024-12-11
Payer: COMMERCIAL

## 2025-01-09 ENCOUNTER — TELEPHONE (OUTPATIENT)
Dept: OBSTETRICS AND GYNECOLOGY | Facility: CLINIC | Age: 50
End: 2025-01-09
Payer: COMMERCIAL

## 2025-01-09 NOTE — TELEPHONE ENCOUNTER
Pharmacy sent RF request for terazole vaginal cream. Pt had a +yeast swab with sensitivities 11/21/24. ! RF has been given. Pt will need FUV if sx return or persist after this RF.

## 2025-04-23 ENCOUNTER — APPOINTMENT (OUTPATIENT)
Dept: OBSTETRICS AND GYNECOLOGY | Facility: CLINIC | Age: 50
End: 2025-04-23
Payer: COMMERCIAL

## 2025-04-24 ENCOUNTER — APPOINTMENT (OUTPATIENT)
Dept: OBSTETRICS AND GYNECOLOGY | Facility: CLINIC | Age: 50
End: 2025-04-24
Payer: COMMERCIAL

## 2025-05-28 ENCOUNTER — APPOINTMENT (OUTPATIENT)
Dept: PRIMARY CARE | Facility: CLINIC | Age: 50
End: 2025-05-28
Payer: COMMERCIAL

## 2025-05-28 VITALS
DIASTOLIC BLOOD PRESSURE: 78 MMHG | WEIGHT: 230 LBS | TEMPERATURE: 98 F | BODY MASS INDEX: 36.1 KG/M2 | HEIGHT: 67 IN | RESPIRATION RATE: 18 BRPM | HEART RATE: 86 BPM | SYSTOLIC BLOOD PRESSURE: 122 MMHG

## 2025-05-28 DIAGNOSIS — K21.9 GASTRO-ESOPHAGEAL REFLUX DISEASE WITHOUT ESOPHAGITIS: ICD-10-CM

## 2025-05-28 DIAGNOSIS — E11.9 TYPE 2 DIABETES MELLITUS WITHOUT COMPLICATION, WITHOUT LONG-TERM CURRENT USE OF INSULIN: ICD-10-CM

## 2025-05-28 DIAGNOSIS — Z12.31 BREAST CANCER SCREENING BY MAMMOGRAM: ICD-10-CM

## 2025-05-28 DIAGNOSIS — E55.9 VITAMIN D DEFICIENCY: Primary | ICD-10-CM

## 2025-05-28 LAB — POC HEMOGLOBIN A1C: 6.3 % (ref 4.2–6.5)

## 2025-05-28 PROCEDURE — 3044F HG A1C LEVEL LT 7.0%: CPT | Performed by: FAMILY MEDICINE

## 2025-05-28 PROCEDURE — 1036F TOBACCO NON-USER: CPT | Performed by: FAMILY MEDICINE

## 2025-05-28 PROCEDURE — 3074F SYST BP LT 130 MM HG: CPT | Performed by: FAMILY MEDICINE

## 2025-05-28 PROCEDURE — 3008F BODY MASS INDEX DOCD: CPT | Performed by: FAMILY MEDICINE

## 2025-05-28 PROCEDURE — 3078F DIAST BP <80 MM HG: CPT | Performed by: FAMILY MEDICINE

## 2025-05-28 PROCEDURE — 83036 HEMOGLOBIN GLYCOSYLATED A1C: CPT | Performed by: FAMILY MEDICINE

## 2025-05-28 PROCEDURE — 99214 OFFICE O/P EST MOD 30 MIN: CPT | Performed by: FAMILY MEDICINE

## 2025-05-28 RX ORDER — PANTOPRAZOLE SODIUM 40 MG/1
40 TABLET, DELAYED RELEASE ORAL
Qty: 90 TABLET | Refills: 1 | Status: SHIPPED | OUTPATIENT
Start: 2025-05-28

## 2025-05-28 ASSESSMENT — PATIENT HEALTH QUESTIONNAIRE - PHQ9
SUM OF ALL RESPONSES TO PHQ9 QUESTIONS 1 AND 2: 0
2. FEELING DOWN, DEPRESSED OR HOPELESS: NOT AT ALL
1. LITTLE INTEREST OR PLEASURE IN DOING THINGS: NOT AT ALL

## 2025-05-28 NOTE — PROGRESS NOTES
Molly Martins is a 50 y.o. female here today for   Chief Complaint   Patient presents with    Diabetes    Anxiety    GERD        HPI     Diabetes recheck -- Patient feeling well.  No CP, numbness of feet, blurred vision, polyuria.  Trying to follow diet.  No side effects from medications.  No hypoglycemic symptoms.  1C is very good at 6.3 today.  Her weight has been stable even though she takes semaglutide 2 mg once weekly.      Mood disorder recheck --this is managed by her psychiatrist and she says it has been stable and well-controlled.    GERD recheck -- Patient reports GI symptoms are well controlled with current treatment.  No heartburn, chest pain, vomiting, diarrhea.  No SE's from current treatment.  Patient wishes to continue the same treatment.       She is due for mammogram and requests that I order one today.    Current Outpatient Medications   Medication Instructions    empagliflozin (JARDIANCE) 25 mg, oral, Daily    pantoprazole (PROTONIX) 40 mg, oral, Daily before breakfast, Do not crush, chew, or split.    semaglutide 2 mg, subcutaneous, Once Weekly    sertraline (Zoloft) 100 mg tablet TAKE 2 TABLETS DAILY       Patient Active Problem List    Diagnosis Date Noted    Benign esophageal stricture 02/26/2023    Generalized anxiety disorder 02/26/2023    Type 2 diabetes mellitus without complication, without long-term current use of insulin 02/26/2023    Moderate obstructive sleep apnea 02/26/2023    Vitamin D deficiency 02/26/2023    Yeast infection 11/21/2024    Amenorrhea 06/24/2024    Daytime somnolence 06/24/2024    Lateral epicondylitis of left elbow 06/24/2024    Vaginal itching 04/19/2024    Vasomotor symptoms due to menopause 04/19/2024    Vaginal atrophy 04/19/2024    Well woman exam 04/19/2024    Cervicalgia 07/17/2023    Left shoulder pain 07/17/2023    Abdominal bloating 02/26/2023    Abnormal mammogram 02/26/2023    Bilateral arm weakness 02/26/2023    Bilateral high frequency  "sensorineural hearing loss 02/26/2023    Carpal tunnel syndrome 02/26/2023    Dysfunction of right eustachian tube 02/26/2023    Early menopause occurring in patient age younger than 45 years 02/26/2023    Fatigue 02/26/2023    GERD without esophagitis 02/26/2023    Hearing loss 02/26/2023    Shakiness 02/26/2023    Tinnitus of both ears 02/26/2023    Weight gain 02/26/2023    Class 2 severe obesity with serious comorbidity and body mass index (BMI) of 38.0 to 38.9 in adult 02/26/2023    Symptomatic cholelithiasis 05/07/2020    Polyhydramnios 06/09/2012         Objective    Visit Vitals    Visit Vitals  /78   Pulse 86   Temp 36.7 °C (98 °F)   Resp 18   Ht 1.702 m (5' 7\")   Wt 104 kg (230 lb)   BMI 36.02 kg/m²   OB Status Menopausal   Smoking Status Never   BSA 2.22 m²       Body mass index is 36.02 kg/m².     Physical Exam     General - Not in acute distress and cooperative.  Build & Nutrition - Well developed  Posture - Normal  Gait - Normal  Mental Status - alert and oriented x 3    Head - Normocephalic    Neck - Thyroid normal size    Eyes - Bilateral - Sclera clear and lids pink without edema or mass.      Skin - Warm and dry with no rashes on visible skin    Lungs - Clear to auscultation and normal breathing effort    Cardiovascular - RRR and no murmurs, rubs or thrill.    Peripheral Vascular - Bilateral - no edema present    Neuropsychiatric - normal mood and affect    Breasts -no lumps or masses or nipple discharge.    Assessment & Plan  Type 2 diabetes mellitus without complication, without long-term current use of insulin  Condition well controlled.  No change in current treatment regimen.  Refill given of current medication.  Appropriate labs ordered or reviewed.  Make a follow up appointment with me for recheck in 6 months.  Orders:    POCT glycosylated hemoglobin (Hb A1C) manually resulted    Comprehensive Metabolic Panel; Future    CBC; Future    Lipid Panel; Future    empagliflozin (Jardiance) 25 " mg tablet; Take 1 tablet (25 mg) by mouth once daily.    Vitamin D deficiency  Appropriate labs ordered or reviewed.  Orders:    Vitamin D 25-Hydroxy,Total (for eval of Vitamin D levels); Future    Breast cancer screening by mammogram    Orders:    BI mammo bilateral screening tomosynthesis; Future    Gastro-esophageal reflux disease without esophagitis  I told her to try decreasing the pantoprazole.  She can try it every other day and if she is not having any reflux symptoms she can decrease to as needed after a few weeks.  If she does have recurrence of reflux symptoms then she can go back to taking it every day.  Reviewed long term risk vs benefit of chronic PPI use.  Reviewed cardiac, skeletal and kidney possible SE's in detail.  Recommend to take as little as possible for symptom control to minimize long term risks.  Pt understands fully.    Orders:    pantoprazole (ProtoNix) 40 mg EC tablet; Take 1 tablet (40 mg) by mouth once daily in the morning. Take before meals. Do not crush, chew, or split.         Orders Placed This Encounter      empagliflozin (Jardiance) 25 mg tablet      pantoprazole (ProtoNix) 40 mg EC tablet       Orders Placed This Encounter   Procedures    BI mammo bilateral screening tomosynthesis    Comprehensive Metabolic Panel    CBC    Lipid Panel    Vitamin D 25-Hydroxy,Total (for eval of Vitamin D levels)    POCT glycosylated hemoglobin (Hb A1C) manually resulted        New Medications Ordered This Visit   Medications    empagliflozin (Jardiance) 25 mg tablet     Sig: Take 1 tablet (25 mg) by mouth once daily.     Dispense:  90 tablet     Refill:  1    pantoprazole (ProtoNix) 40 mg EC tablet     Sig: Take 1 tablet (40 mg) by mouth once daily in the morning. Take before meals. Do not crush, chew, or split.     Dispense:  90 tablet     Refill:  1

## 2025-05-28 NOTE — ASSESSMENT & PLAN NOTE
Condition well controlled.  No change in current treatment regimen.  Refill given of current medication.  Appropriate labs ordered or reviewed.  Make a follow up appointment with me for recheck in 6 months.  Orders:    POCT glycosylated hemoglobin (Hb A1C) manually resulted    Comprehensive Metabolic Panel; Future    CBC; Future    Lipid Panel; Future    empagliflozin (Jardiance) 25 mg tablet; Take 1 tablet (25 mg) by mouth once daily.

## 2025-05-29 ENCOUNTER — APPOINTMENT (OUTPATIENT)
Dept: OBSTETRICS AND GYNECOLOGY | Facility: CLINIC | Age: 50
End: 2025-05-29
Payer: COMMERCIAL

## 2025-06-17 ENCOUNTER — APPOINTMENT (OUTPATIENT)
Dept: RADIOLOGY | Facility: CLINIC | Age: 50
End: 2025-06-17
Payer: COMMERCIAL

## 2025-06-27 ENCOUNTER — APPOINTMENT (OUTPATIENT)
Dept: OBSTETRICS AND GYNECOLOGY | Facility: CLINIC | Age: 50
End: 2025-06-27
Payer: COMMERCIAL

## 2025-07-14 DIAGNOSIS — E11.9 TYPE 2 DIABETES MELLITUS WITHOUT COMPLICATION, WITHOUT LONG-TERM CURRENT USE OF INSULIN: ICD-10-CM

## 2025-07-15 ENCOUNTER — APPOINTMENT (OUTPATIENT)
Dept: RADIOLOGY | Facility: CLINIC | Age: 50
End: 2025-07-15
Payer: COMMERCIAL

## 2025-07-15 VITALS — WEIGHT: 230 LBS | HEIGHT: 67 IN | BODY MASS INDEX: 36.1 KG/M2

## 2025-07-15 DIAGNOSIS — Z12.31 BREAST CANCER SCREENING BY MAMMOGRAM: ICD-10-CM

## 2025-07-15 PROCEDURE — 77067 SCR MAMMO BI INCL CAD: CPT | Performed by: RADIOLOGY

## 2025-07-15 PROCEDURE — 77063 BREAST TOMOSYNTHESIS BI: CPT

## 2025-07-15 PROCEDURE — 77063 BREAST TOMOSYNTHESIS BI: CPT | Performed by: RADIOLOGY

## 2025-07-17 ENCOUNTER — TELEPHONE (OUTPATIENT)
Dept: PRIMARY CARE | Facility: CLINIC | Age: 50
End: 2025-07-17
Payer: COMMERCIAL

## 2025-07-17 DIAGNOSIS — R92.8 ABNORMAL MAMMOGRAM: ICD-10-CM

## 2025-07-17 NOTE — TELEPHONE ENCOUNTER
Pt called and stated she was informed by the mammography department that her mammogram was abnormal and she needs a diagnotic. Please advise.

## 2025-07-18 ENCOUNTER — TELEPHONE (OUTPATIENT)
Dept: PRIMARY CARE | Facility: CLINIC | Age: 50
End: 2025-07-18
Payer: COMMERCIAL

## 2025-08-19 ENCOUNTER — HOSPITAL ENCOUNTER (OUTPATIENT)
Dept: RADIOLOGY | Facility: HOSPITAL | Age: 50
Discharge: HOME | End: 2025-08-19
Payer: COMMERCIAL

## 2025-08-19 DIAGNOSIS — R92.8 ABNORMAL MAMMOGRAM: ICD-10-CM

## 2025-08-19 PROCEDURE — 76642 ULTRASOUND BREAST LIMITED: CPT | Mod: RIGHT SIDE | Performed by: RADIOLOGY

## 2025-08-19 PROCEDURE — 77061 BREAST TOMOSYNTHESIS UNI: CPT | Mod: RIGHT SIDE | Performed by: RADIOLOGY

## 2025-08-19 PROCEDURE — 76642 ULTRASOUND BREAST LIMITED: CPT | Mod: RT

## 2025-08-19 PROCEDURE — 77065 DX MAMMO INCL CAD UNI: CPT | Mod: RT

## 2025-08-19 PROCEDURE — 77065 DX MAMMO INCL CAD UNI: CPT | Mod: RIGHT SIDE | Performed by: RADIOLOGY

## 2025-08-21 ENCOUNTER — TELEPHONE (OUTPATIENT)
Dept: PRIMARY CARE | Facility: CLINIC | Age: 50
End: 2025-08-21
Payer: COMMERCIAL

## 2025-08-21 DIAGNOSIS — Z12.31 ENCOUNTER FOR SCREENING MAMMOGRAM FOR MALIGNANT NEOPLASM OF BREAST: ICD-10-CM

## 2025-08-21 DIAGNOSIS — R92.8 ABNORMAL MAMMOGRAM: ICD-10-CM

## 2025-08-22 ENCOUNTER — APPOINTMENT (OUTPATIENT)
Dept: OBSTETRICS AND GYNECOLOGY | Facility: CLINIC | Age: 50
End: 2025-08-22
Payer: COMMERCIAL

## 2025-10-22 ENCOUNTER — APPOINTMENT (OUTPATIENT)
Dept: OBSTETRICS AND GYNECOLOGY | Facility: CLINIC | Age: 50
End: 2025-10-22
Payer: COMMERCIAL

## 2025-12-03 ENCOUNTER — APPOINTMENT (OUTPATIENT)
Dept: PRIMARY CARE | Facility: CLINIC | Age: 50
End: 2025-12-03
Payer: COMMERCIAL